# Patient Record
Sex: FEMALE | Race: WHITE
[De-identification: names, ages, dates, MRNs, and addresses within clinical notes are randomized per-mention and may not be internally consistent; named-entity substitution may affect disease eponyms.]

---

## 2017-02-11 ENCOUNTER — HOSPITAL ENCOUNTER (OUTPATIENT)
Dept: HOSPITAL 53 - M LAB REF | Age: 37
Discharge: HOME | End: 2017-02-11
Attending: PHYSICIAN ASSISTANT
Payer: COMMERCIAL

## 2017-02-11 DIAGNOSIS — J11.1: Primary | ICD-10-CM

## 2017-03-13 ENCOUNTER — HOSPITAL ENCOUNTER (OUTPATIENT)
Dept: HOSPITAL 53 - M LAB REF | Age: 37
End: 2017-03-13
Attending: ADVANCED PRACTICE MIDWIFE
Payer: COMMERCIAL

## 2017-03-13 DIAGNOSIS — Z12.4: Primary | ICD-10-CM

## 2017-03-13 DIAGNOSIS — R87.610: ICD-10-CM

## 2018-01-05 ENCOUNTER — HOSPITAL ENCOUNTER (EMERGENCY)
Dept: HOSPITAL 53 - M ED | Age: 38
LOS: 1 days | Discharge: HOME | End: 2018-01-06
Payer: COMMERCIAL

## 2018-01-05 DIAGNOSIS — R10.9: ICD-10-CM

## 2018-01-05 DIAGNOSIS — K58.9: ICD-10-CM

## 2018-01-05 DIAGNOSIS — Z88.8: ICD-10-CM

## 2018-01-05 DIAGNOSIS — Z97.5: ICD-10-CM

## 2018-01-05 DIAGNOSIS — N39.0: ICD-10-CM

## 2018-01-05 DIAGNOSIS — K21.9: ICD-10-CM

## 2018-01-05 DIAGNOSIS — Z79.899: ICD-10-CM

## 2018-01-05 DIAGNOSIS — K57.90: ICD-10-CM

## 2018-01-05 DIAGNOSIS — Z87.440: ICD-10-CM

## 2018-01-05 DIAGNOSIS — Z88.5: ICD-10-CM

## 2018-01-05 DIAGNOSIS — Z88.0: ICD-10-CM

## 2018-01-05 DIAGNOSIS — N83.299: Primary | ICD-10-CM

## 2018-01-05 LAB
ALBUMIN/GLOBULIN RATIO: 0.73 (ref 1–1.93)
ALBUMIN: 3.6 GM/DL (ref 3.2–5.2)
ALKALINE PHOSPHATASE: 146 U/L (ref 45–117)
ALT SERPL W P-5'-P-CCNC: 22 U/L (ref 12–78)
ANION GAP: 8 MEQ/L (ref 8–16)
AST SERPL-CCNC: 14 U/L (ref 7–37)
BASO #: 0.1 10^3/UL (ref 0–0.2)
BASO %: 0.3 % (ref 0–1)
BILIRUB CONJ SERPL-MCNC: < 0.1 MG/DL (ref 0–0.2)
BILIRUBIN,TOTAL: 0.3 MG/DL (ref 0.2–1)
BLOOD UREA NITROGEN: 9 MG/DL (ref 7–18)
CALCIUM LEVEL: 8.7 MG/DL (ref 8.5–10.1)
CARBON DIOXIDE LEVEL: 25 MEQ/L (ref 21–32)
CHLORIDE LEVEL: 107 MEQ/L (ref 98–107)
CONTROL LINE UCG: (no result)
CREATININE FOR GFR: 0.78 MG/DL (ref 0.55–1.02)
EOS #: 0 10^3/UL (ref 0–0.5)
EOSINOPHIL NFR BLD AUTO: 0.1 % (ref 0–3)
GFR SERPL CREATININE-BSD FRML MDRD: > 60 ML/MIN/{1.73_M2} (ref 60–?)
GLUCOSE, FASTING: 82 MG/DL (ref 70–105)
HEMATOCRIT: 44.7 % (ref 36–47)
HEMOGLOBIN: 14.6 G/DL (ref 12–16)
IMMATURE GRANULOCYTE #: 0.1 10^3/UL (ref 0–0)
IMMATURE GRANULOCYTE %: 0.4 % (ref 0–0)
INR: 1.16
LEUKOCYTE ESTERASE UR AUTO RFX: NEGATIVE
LIPASE: 108 U/L (ref 73–393)
LYMPH #: 1.6 10^3/UL (ref 1.5–4.5)
LYMPH %: 9.3 % (ref 24–44)
MEAN CORPUSCULAR HEMOGLOBIN: 29.9 PG (ref 27–33)
MEAN CORPUSCULAR HGB CONC: 32.7 G/DL (ref 32–36.5)
MEAN CORPUSCULAR VOLUME: 91.6 FL (ref 80–96)
MONO #: 0.6 10^3/UL (ref 0–0.8)
MONO %: 3.7 % (ref 0–5)
NEUTROPHILS #: 14.4 10^3/UL (ref 1.8–7.7)
NEUTROPHILS %: 86.2 % (ref 36–66)
NRBC BLD AUTO-RTO: 0 % (ref 0–0)
PLATELET COUNT, AUTOMATED: 353 10^3/UL (ref 150–450)
POTASSIUM SERUM: 3.6 MEQ/L (ref 3.5–5.1)
PROTHROMBIN TIME: 15 SECONDS (ref 12.4–14.5)
RED BLOOD COUNT: 4.88 10^6/UL (ref 4–5.4)
RED CELL DISTRIBUTION WIDTH: 12.4 % (ref 11.5–14.5)
SODIUM LEVEL: 140 MEQ/L (ref 136–145)
SPECIFIC GRAVITY UR AUTO RFX: 1.02 (ref 1–1.03)
SQUAM EPITHELIAL CELL UR AURFX: 1 /HPF (ref 0–6)
TOTAL PROTEIN: 8.5 GM/DL (ref 6.4–8.2)
URINE PREG TEST: NEGATIVE
WHITE BLOOD COUNT: 16.8 10^3/UL (ref 4–10)

## 2018-01-05 RX ADMIN — MORPHINE SULFATE 1 MG: 2 INJECTION, SOLUTION INTRAMUSCULAR; INTRAVENOUS at 21:55

## 2018-01-05 RX ADMIN — ONDANSETRON 1 MG: 2 INJECTION INTRAMUSCULAR; INTRAVENOUS at 21:15

## 2018-01-05 RX ADMIN — DIATRIZOATE MEGLUMINE AND DIATRIZOATE SODIUM 1 ML: 600; 100 SOLUTION ORAL; RECTAL at 22:35

## 2018-01-05 RX ADMIN — DIATRIZOATE MEGLUMINE AND DIATRIZOATE SODIUM 1 ML: 600; 100 SOLUTION ORAL; RECTAL at 21:30

## 2018-01-05 RX ADMIN — SODIUM CHLORIDE 1 MLS/HR: 9 INJECTION, SOLUTION INTRAVENOUS at 21:15

## 2018-01-06 RX ADMIN — HYDROCODONE BITARTRATE AND ACETAMINOPHEN 1 TAB: 5; 325 TABLET ORAL at 00:03

## 2018-01-06 RX ADMIN — CIPROFLOXACIN HYDROCHLORIDE 1 MG: 500 TABLET, FILM COATED ORAL at 00:02

## 2018-01-11 ENCOUNTER — HOSPITAL ENCOUNTER (OUTPATIENT)
Dept: HOSPITAL 53 - M LAB REF | Age: 38
End: 2018-01-11
Attending: NURSE PRACTITIONER
Payer: COMMERCIAL

## 2018-01-11 DIAGNOSIS — R31.9: Primary | ICD-10-CM

## 2018-01-11 PROCEDURE — 87086 URINE CULTURE/COLONY COUNT: CPT

## 2018-06-17 ENCOUNTER — HOSPITAL ENCOUNTER (OUTPATIENT)
Dept: HOSPITAL 53 - M WUC | Age: 38
End: 2018-06-17
Attending: PHYSICIAN ASSISTANT
Payer: SELF-PAY
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
## 2018-06-17 DIAGNOSIS — S60.211A: Primary | ICD-10-CM
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
## 2018-06-17 PROCEDURE — 73110 X-RAY EXAM OF WRIST: CPT

## 2018-11-29 ENCOUNTER — HOSPITAL ENCOUNTER (EMERGENCY)
Dept: HOSPITAL 53 - M ED | Age: 38
Discharge: HOME | End: 2018-11-29
Payer: COMMERCIAL

## 2018-11-29 DIAGNOSIS — Z87.891: ICD-10-CM

## 2018-11-29 DIAGNOSIS — K21.9: ICD-10-CM

## 2018-11-29 DIAGNOSIS — J69.0: Primary | ICD-10-CM

## 2018-11-29 DIAGNOSIS — M19.90: ICD-10-CM

## 2018-11-29 DIAGNOSIS — K58.9: ICD-10-CM

## 2018-11-29 DIAGNOSIS — Z86.19: ICD-10-CM

## 2018-11-29 DIAGNOSIS — G43.909: ICD-10-CM

## 2018-11-29 DIAGNOSIS — Z79.01: ICD-10-CM

## 2018-11-29 DIAGNOSIS — F90.9: ICD-10-CM

## 2018-11-29 DIAGNOSIS — Z86.711: ICD-10-CM

## 2018-11-29 LAB
ANION GAP: 8 MEQ/L (ref 8–16)
BASO #: 0 10^3/UL (ref 0–0.2)
BASO %: 0.2 % (ref 0–1)
BLOOD UREA NITROGEN: 9 MG/DL (ref 7–18)
CALCIUM LEVEL: 8.5 MG/DL (ref 8.5–10.1)
CARBON DIOXIDE LEVEL: 24 MEQ/L (ref 21–32)
CHLORIDE LEVEL: 105 MEQ/L (ref 98–107)
CK MB CFR.DF SERPL CALC: 1.28
CK SERPL-CCNC: 78 U/L (ref 26–192)
CK-MB VALUE MASS: < 1 NG/ML (ref ?–3.6)
CONTROL LINE HCG: (no result)
CREATININE FOR GFR: 0.96 MG/DL (ref 0.55–1.3)
CRP SERPL-MCNC: 2.37 MG/DL (ref 0–0.3)
EOS #: 0 10^3/UL (ref 0–0.5)
EOSINOPHIL NFR BLD AUTO: 0 % (ref 0–3)
ERYTHROCYTE SEDIMENTATION RATE: 28 MM/HR (ref 0–20)
GFR SERPL CREATININE-BSD FRML MDRD: > 60 ML/MIN/{1.73_M2} (ref 60–?)
GLUCOSE, FASTING: 100 MG/DL (ref 70–100)
HCG, SERUM QUALITATIVE: NEGATIVE
HEMATOCRIT: 40.3 % (ref 36–47)
HEMOGLOBIN: 13.3 G/DL (ref 12–15.5)
IMMATURE GRANULOCYTE %: 0.3 % (ref 0–3)
INR: 1.58
LYMPH #: 0.8 10^3/UL (ref 1.5–4.5)
LYMPH %: 4.7 % (ref 24–44)
MEAN CORPUSCULAR HEMOGLOBIN: 29.7 PG (ref 27–33)
MEAN CORPUSCULAR HGB CONC: 33 G/DL (ref 32–36.5)
MEAN CORPUSCULAR VOLUME: 90 FL (ref 80–96)
MONO #: 0.7 10^3/UL (ref 0–0.8)
MONO %: 4 % (ref 0–5)
NEUTROPHILS #: 16 10^3/UL (ref 1.8–7.7)
NEUTROPHILS %: 90.8 % (ref 36–66)
NRBC BLD AUTO-RTO: 0 % (ref 0–0)
NT-PRO BNP: 47 PG/ML (ref ?–125)
PARTIAL THROMBOPLASTIN TIME: 34.4 SECONDS (ref 25.4–37.6)
PLATELET COUNT, AUTOMATED: 368 10^3/UL (ref 150–450)
POTASSIUM SERUM: 3.8 MEQ/L (ref 3.5–5.1)
PROTHROMBIN TIME: 19.2 SECONDS (ref 12.1–14.4)
RED BLOOD COUNT: 4.48 10^6/UL (ref 4–5.4)
RED CELL DISTRIBUTION WIDTH: 12.7 % (ref 11.5–14.5)
SODIUM LEVEL: 137 MEQ/L (ref 136–145)
TROPONIN I: < 0.02 NG/ML (ref ?–0.1)
WHITE BLOOD COUNT: 17.6 10^3/UL (ref 4–10)

## 2018-11-29 RX ADMIN — HYDROCODONE BITARTRATE AND ACETAMINOPHEN 1 TAB: 5; 325 TABLET ORAL at 20:15

## 2018-11-29 RX ADMIN — SODIUM CHLORIDE 1 MLS/HR: 9 INJECTION, SOLUTION INTRAVENOUS at 16:04

## 2018-11-29 RX ADMIN — ACETAMINOPHEN 1 MG: 325 TABLET ORAL at 16:03

## 2018-11-29 RX ADMIN — CLINDAMYCIN HYDROCHLORIDE 1 MG: 150 CAPSULE ORAL at 20:00

## 2018-11-29 RX ADMIN — KETOROLAC TROMETHAMINE 1 MG: 30 INJECTION, SOLUTION INTRAMUSCULAR at 18:30

## 2018-12-05 ENCOUNTER — HOSPITAL ENCOUNTER (OUTPATIENT)
Dept: HOSPITAL 53 - M LAB REF | Age: 38
End: 2018-12-05
Attending: NURSE PRACTITIONER
Payer: COMMERCIAL

## 2018-12-05 DIAGNOSIS — R19.7: Primary | ICD-10-CM

## 2018-12-05 PROCEDURE — 87507 IADNA-DNA/RNA PROBE TQ 12-25: CPT

## 2019-04-12 ENCOUNTER — HOSPITAL ENCOUNTER (OUTPATIENT)
Dept: HOSPITAL 53 - M LAB REF | Age: 39
End: 2019-04-12
Attending: NURSE PRACTITIONER
Payer: COMMERCIAL

## 2019-04-12 DIAGNOSIS — N76.0: Primary | ICD-10-CM

## 2019-09-10 ENCOUNTER — HOSPITAL ENCOUNTER (OUTPATIENT)
Dept: HOSPITAL 53 - M LAB REF | Age: 39
End: 2019-09-10
Attending: ADVANCED PRACTICE MIDWIFE
Payer: COMMERCIAL

## 2019-09-10 DIAGNOSIS — R10.2: Primary | ICD-10-CM

## 2019-09-10 DIAGNOSIS — Z12.4: Primary | ICD-10-CM

## 2019-09-10 LAB
CHLAMYDIA DNA AMPLIFICATION: NEGATIVE
N GONORRHOEA RRNA SPEC QL NAA+PROBE: NEGATIVE

## 2019-09-10 PROCEDURE — 87624 HPV HI-RISK TYP POOLED RSLT: CPT

## 2019-09-11 LAB — HPV LOW VOL RFLX: (no result)

## 2019-09-17 ENCOUNTER — HOSPITAL ENCOUNTER (OUTPATIENT)
Dept: HOSPITAL 53 - M RAD | Age: 39
End: 2019-09-17
Attending: ADVANCED PRACTICE MIDWIFE
Payer: COMMERCIAL

## 2019-09-17 DIAGNOSIS — N63.20: Primary | ICD-10-CM

## 2019-09-17 DIAGNOSIS — N92.6: ICD-10-CM

## 2019-09-17 NOTE — REPMRS
Patient History

The patient states she had a clinical breast exam in Sept. 2019.

Family history of breast cancer at age 58 in mother, ovarian 

cancer at age 60 in maternal aunt, bladder cancer in father.

Taking hormonal contraceptives for 2 years 6 months.

 

3D TOMOSYNTHESIS WAS PERFORMED.

 

 

Digital Mammo Diagnostic Bilateral: September 17, 2019 - Exam #: 

[id number]

Bilateral CC and MLO view(s) were taken.

 

Technologist: Amanda Jeffery, Technologist

FINDINGS: The breast tissue is extremely dense which could 

obscure a lesion on mammography.  There is no evidence of cancer 

on this mammogram.

 

Assessment: BI-RADS/ACR category 2 mammogram. Benign Findings.

 

Recommendation

Routine screening mammogram of both breasts in 1 year (for women 

over age 40).

This mammogram was interpreted with the aid of an FDA-approved 

computer-aided dectection system.

 

THE LIFETIME RISK OF BREAST CANCER IS  28.6%, THEREFORE 

SUPPLEMENTAL SCREENING MRI OF THE BREASTS IS RECOMMENDED IN 6 

MONTHS.

 

Electronically Signed By: Marvin Fish MD 09/17/19 0917

## 2019-09-18 NOTE — REP
Clinical:  Irregular menstrual cycles and menorrhagia .

 

Technique:  Transabdominal pelvic ultrasound with color Doppler evaluation of the

ovaries.

 

Findings:

 

Bladder is unremarkable and measures 7.2 x 2.1 x 5.0 cm

 

Normal anteverted uterus measures 8.8 x 3.8 x 5.6 cm .  The endometrial complex

measures 2.5 mm thickness.  No discrete uterine or endometrial abnormalities are

appreciated.  IUD identified in satisfactory position.

 

Bilateral ovaries are normal in appearance and vascularity without evidence for

torsion.  Right ovary measures 3.2 x 2.0 x 2.4 cm ; R I = 0.58 .  Left ovary

measures 3.2 x 1.3 x 1.9 cm ; R I = 0.70.  Incidental 2.1 cm right ovarian cyst

likely physiologic.

 

No pelvic free fluid or adnexal mass lesion

 

Impression:

1.  Normal pelvic ultrasound.

 

 

Electronically Signed by

Balta Eldridge MD 09/18/2019 04:32 A

## 2019-11-25 ENCOUNTER — HOSPITAL ENCOUNTER (EMERGENCY)
Dept: HOSPITAL 53 - M ED | Age: 39
Discharge: HOME | End: 2019-11-25
Payer: COMMERCIAL

## 2019-11-25 VITALS — DIASTOLIC BLOOD PRESSURE: 72 MMHG | SYSTOLIC BLOOD PRESSURE: 125 MMHG

## 2019-11-25 VITALS — BODY MASS INDEX: 38.92 KG/M2 | HEIGHT: 64 IN | WEIGHT: 227.96 LBS

## 2019-11-25 DIAGNOSIS — Z88.8: ICD-10-CM
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
## 2019-11-25 DIAGNOSIS — S50.01XA: ICD-10-CM

## 2019-11-25 DIAGNOSIS — Z88.1: ICD-10-CM

## 2019-11-25 DIAGNOSIS — F43.10: ICD-10-CM

## 2019-11-25 DIAGNOSIS — Z88.0: ICD-10-CM

## 2019-11-25 DIAGNOSIS — Z79.899: ICD-10-CM
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
## 2019-11-25 DIAGNOSIS — Z88.5: ICD-10-CM

## 2019-11-25 DIAGNOSIS — S30.0XXA: ICD-10-CM

## 2019-11-25 DIAGNOSIS — S09.90XA: Primary | ICD-10-CM

## 2019-11-25 DIAGNOSIS — F41.9: ICD-10-CM

## 2019-11-25 DIAGNOSIS — Z79.01: ICD-10-CM

## 2019-11-25 DIAGNOSIS — F33.9: ICD-10-CM

## 2019-11-25 DIAGNOSIS — S63.501A: ICD-10-CM

## 2019-11-25 DIAGNOSIS — J45.909: ICD-10-CM

## 2019-11-25 DIAGNOSIS — S16.1XXA: ICD-10-CM

## 2019-11-25 LAB
ALBUMIN SERPL BCG-MCNC: 3.3 GM/DL (ref 3.2–5.2)
ALT SERPL W P-5'-P-CCNC: 14 U/L (ref 12–78)
BASOPHILS # BLD AUTO: 0.1 10^3/UL (ref 0–0.2)
BASOPHILS NFR BLD AUTO: 0.6 % (ref 0–1)
BILIRUB CONJ SERPL-MCNC: < 0.1 MG/DL (ref 0–0.2)
BILIRUB SERPL-MCNC: 0.2 MG/DL (ref 0.2–1)
BUN SERPL-MCNC: 5 MG/DL (ref 7–18)
CALCIUM SERPL-MCNC: 8.5 MG/DL (ref 8.5–10.1)
CHLORIDE SERPL-SCNC: 106 MEQ/L (ref 98–107)
CO2 SERPL-SCNC: 26 MEQ/L (ref 21–32)
CREAT SERPL-MCNC: 0.94 MG/DL (ref 0.55–1.3)
EOSINOPHIL # BLD AUTO: 0.1 10^3/UL (ref 0–0.5)
EOSINOPHIL NFR BLD AUTO: 0.8 % (ref 0–3)
GFR SERPL CREATININE-BSD FRML MDRD: > 60 ML/MIN/{1.73_M2} (ref 60–?)
GLUCOSE SERPL-MCNC: 85 MG/DL (ref 70–100)
HCT VFR BLD AUTO: 42.2 % (ref 36–47)
HGB BLD-MCNC: 13.5 G/DL (ref 12–15.5)
LIPASE SERPL-CCNC: 50 U/L (ref 73–393)
LYMPHOCYTES # BLD AUTO: 1.8 10^3/UL (ref 1.5–5)
LYMPHOCYTES NFR BLD AUTO: 22.9 % (ref 24–44)
MCH RBC QN AUTO: 29.3 PG (ref 27–33)
MCHC RBC AUTO-ENTMCNC: 32 G/DL (ref 32–36.5)
MCV RBC AUTO: 91.5 FL (ref 80–96)
MONOCYTES # BLD AUTO: 0.5 10^3/UL (ref 0–0.8)
MONOCYTES NFR BLD AUTO: 6.7 % (ref 0–5)
NEUTROPHILS # BLD AUTO: 5.4 10^3/UL (ref 1.5–8.5)
NEUTROPHILS NFR BLD AUTO: 68.6 % (ref 36–66)
PLATELET # BLD AUTO: 346 10^3/UL (ref 150–450)
POTASSIUM SERPL-SCNC: 4 MEQ/L (ref 3.5–5.1)
PROT SERPL-MCNC: 7.3 GM/DL (ref 6.4–8.2)
RBC # BLD AUTO: 4.61 10^6/UL (ref 4–5.4)
SODIUM SERPL-SCNC: 139 MEQ/L (ref 136–145)
WBC # BLD AUTO: 7.9 10^3/UL (ref 4–10)

## 2019-11-25 PROCEDURE — 80076 HEPATIC FUNCTION PANEL: CPT

## 2019-11-25 PROCEDURE — 74177 CT ABD & PELVIS W/CONTRAST: CPT

## 2019-11-25 PROCEDURE — 99283 EMERGENCY DEPT VISIT LOW MDM: CPT

## 2019-11-25 PROCEDURE — 72125 CT NECK SPINE W/O DYE: CPT

## 2019-11-25 PROCEDURE — 81001 URINALYSIS AUTO W/SCOPE: CPT

## 2019-11-25 PROCEDURE — 73110 X-RAY EXAM OF WRIST: CPT

## 2019-11-25 PROCEDURE — 85025 COMPLETE CBC W/AUTO DIFF WBC: CPT

## 2019-11-25 PROCEDURE — 73080 X-RAY EXAM OF ELBOW: CPT

## 2019-11-25 PROCEDURE — 72110 X-RAY EXAM L-2 SPINE 4/>VWS: CPT

## 2019-11-25 PROCEDURE — 80048 BASIC METABOLIC PNL TOTAL CA: CPT

## 2019-11-25 PROCEDURE — 70450 CT HEAD/BRAIN W/O DYE: CPT

## 2019-11-25 PROCEDURE — 83690 ASSAY OF LIPASE: CPT

## 2019-11-25 PROCEDURE — 36415 COLL VENOUS BLD VENIPUNCTURE: CPT

## 2019-11-25 NOTE — REP
RIGHT WRIST, FOUR VIEWS:

 

There is no evidence of an acute fracture, dislocation or intrinsic bone

disease.

 

IMPRESSION:

 

No fracture or dislocation.

 

 

 

 

Electronically Signed by

Marvin Fish MD 11/26/2019 09:52 A
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
## 2019-11-25 NOTE — REP
RIGHT ELBOW, FOUR VIEWS:

 

There is no evidence of an acute fracture, dislocation or intrinsic bone

disease.

 

IMPRESSION:

 

No fracture or dislocation.

 

 

Electronically Signed by

Marvin Fish MD 11/26/2019 09:52 A

## 2020-08-28 ENCOUNTER — HOSPITAL ENCOUNTER (OUTPATIENT)
Dept: HOSPITAL 53 - M WUC | Age: 40
End: 2020-08-28
Attending: NURSE PRACTITIONER
Payer: COMMERCIAL

## 2020-08-28 DIAGNOSIS — M94.0: Primary | ICD-10-CM

## 2020-09-15 NOTE — REP
CHEST X-RAY:  



HISTORY:  Costochondral junction syndrome.



TECHNIQUE: PA and lateral 



COMPARISON: 4/25/16



FINDINGS: 

Mediastinum and cardiac silhouette are normal. Lung fields are clear. No focal 
consolidation, effusion or pneumothorax. Surrounding musculoskeletal structure 
are intact and normal in appearance.



IMPRESSION: 

Normal chest x-ray.

MTDD

## 2020-09-28 ENCOUNTER — HOSPITAL ENCOUNTER (EMERGENCY)
Dept: HOSPITAL 53 - M ED | Age: 40
Discharge: HOME | End: 2020-09-28
Payer: COMMERCIAL

## 2020-09-28 VITALS — SYSTOLIC BLOOD PRESSURE: 120 MMHG | DIASTOLIC BLOOD PRESSURE: 83 MMHG

## 2020-09-28 VITALS — BODY MASS INDEX: 42.12 KG/M2 | HEIGHT: 64 IN | WEIGHT: 246.7 LBS

## 2020-09-28 DIAGNOSIS — K21.9: ICD-10-CM

## 2020-09-28 DIAGNOSIS — Z88.1: ICD-10-CM

## 2020-09-28 DIAGNOSIS — Z88.8: ICD-10-CM

## 2020-09-28 DIAGNOSIS — J30.2: ICD-10-CM

## 2020-09-28 DIAGNOSIS — J45.901: Primary | ICD-10-CM

## 2020-09-28 DIAGNOSIS — Z79.899: ICD-10-CM

## 2020-09-28 DIAGNOSIS — Z79.3: ICD-10-CM

## 2020-09-28 DIAGNOSIS — Z88.5: ICD-10-CM

## 2020-09-28 LAB
ALBUMIN SERPL BCG-MCNC: 3.2 GM/DL (ref 3.2–5.2)
ALT SERPL W P-5'-P-CCNC: 23 U/L (ref 12–78)
B-HCG SERPL QL: NEGATIVE
BASOPHILS # BLD AUTO: 0 10^3/UL (ref 0–0.2)
BASOPHILS NFR BLD AUTO: 0.4 % (ref 0–1)
BILIRUB CONJ SERPL-MCNC: < 0.1 MG/DL (ref 0–0.2)
BILIRUB SERPL-MCNC: 0.2 MG/DL (ref 0.2–1)
BUN SERPL-MCNC: 9 MG/DL (ref 7–18)
CALCIUM SERPL-MCNC: 9.2 MG/DL (ref 8.5–10.1)
CHLORIDE SERPL-SCNC: 107 MEQ/L (ref 98–107)
CK MB CFR.DF SERPL CALC: 1.59
CK MB SERPL-MCNC: < 1 NG/ML (ref ?–3.6)
CK SERPL-CCNC: 63 U/L (ref 26–192)
CO2 SERPL-SCNC: 24 MEQ/L (ref 21–32)
CREAT SERPL-MCNC: 0.9 MG/DL (ref 0.55–1.3)
EOSINOPHIL # BLD AUTO: 0.1 10^3/UL (ref 0–0.5)
EOSINOPHIL NFR BLD AUTO: 0.9 % (ref 0–3)
GFR SERPL CREATININE-BSD FRML MDRD: > 60 ML/MIN/{1.73_M2} (ref 58–?)
GLUCOSE SERPL-MCNC: 88 MG/DL (ref 70–100)
HCT VFR BLD AUTO: 39.6 % (ref 36–47)
HGB BLD-MCNC: 13.1 G/DL (ref 12–15.5)
INR PPP: 1.13
LYMPHOCYTES # BLD AUTO: 2.3 10^3/UL (ref 1.5–5)
LYMPHOCYTES NFR BLD AUTO: 26.2 % (ref 24–44)
MCH RBC QN AUTO: 28.9 PG (ref 27–33)
MCHC RBC AUTO-ENTMCNC: 33.1 G/DL (ref 32–36.5)
MCV RBC AUTO: 87.2 FL (ref 80–96)
MONOCYTES # BLD AUTO: 0.5 10^3/UL (ref 0–0.8)
MONOCYTES NFR BLD AUTO: 5.8 % (ref 0–5)
NEUTROPHILS # BLD AUTO: 5.9 10^3/UL (ref 1.5–8.5)
NEUTROPHILS NFR BLD AUTO: 66.4 % (ref 36–66)
NT-PRO BNP: 10 PG/ML (ref ?–125)
PLATELET # BLD AUTO: 411 10^3/UL (ref 150–450)
POTASSIUM SERPL-SCNC: 4 MEQ/L (ref 3.5–5.1)
PROT SERPL-MCNC: 7.5 GM/DL (ref 6.4–8.2)
PROTHROMBIN TIME: 14.7 SECONDS (ref 12.5–14.3)
RBC # BLD AUTO: 4.54 10^6/UL (ref 4–5.4)
SODIUM SERPL-SCNC: 137 MEQ/L (ref 136–145)
T4 SERPL-MCNC: 8.9 UG/DL (ref 4.5–12)
TROPONIN I SERPL-MCNC: < 0.02 NG/ML (ref ?–0.1)
TSH SERPL DL<=0.005 MIU/L-ACNC: 2.67 UIU/ML (ref 0.36–3.74)
WBC # BLD AUTO: 8.9 10^3/UL (ref 4–10)

## 2020-09-28 PROCEDURE — 82553 CREATINE MB FRACTION: CPT

## 2020-09-28 PROCEDURE — 80048 BASIC METABOLIC PNL TOTAL CA: CPT

## 2020-09-28 PROCEDURE — 99285 EMERGENCY DEPT VISIT HI MDM: CPT

## 2020-09-28 PROCEDURE — 93041 RHYTHM ECG TRACING: CPT

## 2020-09-28 PROCEDURE — 71045 X-RAY EXAM CHEST 1 VIEW: CPT

## 2020-09-28 PROCEDURE — 85610 PROTHROMBIN TIME: CPT

## 2020-09-28 PROCEDURE — 93005 ELECTROCARDIOGRAM TRACING: CPT

## 2020-09-28 PROCEDURE — 87040 BLOOD CULTURE FOR BACTERIA: CPT

## 2020-09-28 PROCEDURE — 96374 THER/PROPH/DIAG INJ IV PUSH: CPT

## 2020-09-28 PROCEDURE — 84703 CHORIONIC GONADOTROPIN ASSAY: CPT

## 2020-09-28 PROCEDURE — 84436 ASSAY OF TOTAL THYROXINE: CPT

## 2020-09-28 PROCEDURE — 85025 COMPLETE CBC W/AUTO DIFF WBC: CPT

## 2020-09-28 PROCEDURE — 84484 ASSAY OF TROPONIN QUANT: CPT

## 2020-09-28 PROCEDURE — 94760 N-INVAS EAR/PLS OXIMETRY 1: CPT

## 2020-09-28 PROCEDURE — 83880 ASSAY OF NATRIURETIC PEPTIDE: CPT

## 2020-09-28 PROCEDURE — 80076 HEPATIC FUNCTION PANEL: CPT

## 2020-09-28 PROCEDURE — 84443 ASSAY THYROID STIM HORMONE: CPT

## 2020-09-28 PROCEDURE — 94640 AIRWAY INHALATION TREATMENT: CPT

## 2020-09-28 PROCEDURE — 82550 ASSAY OF CK (CPK): CPT

## 2020-09-28 NOTE — REPVR
PROCEDURE INFORMATION: 

Exam: XR Chest, 1 View 

Exam date and time: 9/28/2020 8:47 PM 

Age: 40 years old 

Clinical indication: Chest pain; Type not specified; Additional info: 

Dyspnea/cough 



TECHNIQUE: 

Imaging protocol: XR of the chest 

Views: 1 view. 



COMPARISON: 

CR CHEST 2 VIEW 8/28/2020 11:57 AM 



FINDINGS: 

Lungs: Unremarkable. No consolidation. 

Pleural space: Unremarkable. No pleural effusion. No pneumothorax. 

Heart/Mediastinum: Unremarkable. No cardiomegaly. 

Bones/joints: Unremarkable. 



IMPRESSION: 

No acute findings. 



Electronically signed by: Gunnar Franklin On 09/28/2020  22:24:34 PM

## 2020-09-29 NOTE — ECGEPIP
[facility] - ED

                                       

                                       Test Date:    2020

Pat Name:     FREDY SAMANO          Department:   

Patient ID:   [id number]                 Room:         -

Gender:       Female                   Technician:   tonya

:          1980               Requested By: ZACK LOPEZ

Order Number: [id number]     Reading MD:   Narendra Higgins

                                 Measurements

Intervals                              Axis          

Rate:         84                       P:            15

ID:           [id number]                      QRS:          -15

QRSD:         86                       T:            31

QT:           341                                    

QTc:          403                                    

                           Interpretive Statements

SINUS RHYTHM

POSSIBLE PRIOR INFERIOR INFARCT

SIMILAR TO 18

Electronically Signed on 2020 9:39:09 EDT by Narendra Higgins

## 2021-01-06 ENCOUNTER — HOSPITAL ENCOUNTER (OUTPATIENT)
Dept: HOSPITAL 53 - M LAB | Age: 41
End: 2021-01-06
Attending: INTERNAL MEDICINE
Payer: COMMERCIAL

## 2021-01-06 DIAGNOSIS — M79.10: Primary | ICD-10-CM

## 2021-01-06 LAB
25(OH)D3 SERPL-MCNC: 47 NG/ML (ref 30–100)
CK SERPL-CCNC: 63 U/L (ref 26–192)
MAGNESIUM SERPL-MCNC: 2.2 MG/DL (ref 1.8–2.4)
PHOSPHATE SERPL-MCNC: 2.2 MG/DL (ref 2.5–4.9)
VIT B12 SERPL-MCNC: 410 PG/ML (ref 247–911)

## 2021-01-21 ENCOUNTER — HOSPITAL ENCOUNTER (OUTPATIENT)
Dept: HOSPITAL 53 - M WHC | Age: 41
End: 2021-01-21
Attending: ADVANCED PRACTICE MIDWIFE
Payer: COMMERCIAL

## 2021-01-21 DIAGNOSIS — Z12.31: Primary | ICD-10-CM

## 2021-01-21 NOTE — REPMRS
Patient History

The patient states she had a clinical breast exam in January 2021.

Family history of breast cancer at age 58 in mother, ovarian 

cancer at age 60 in maternal aunt, unknown cancer in father.

Took hormonal contraceptives for 4 years 10 months.

 

3D TOMOSYNTHESIS WAS PERFORMED.

 

Volpara breast density a.

 

Digital Woman Screen Mammo: January 21, 2021 - Exam #: 

[id number]

Bilateral CC and MLO view(s) were taken.

 

Technologist: Conchis López, Technologist

Prior study comparison: September 17, 2019, digital mammo 

diagnostic bilateral, performed at [facility].

 

FINDINGS: There are scattered fibroglandular densities.  There 

has been no change in the appearance of the mammogram from the 

prior studies.  There is a mild amount of residual fibroglandular

tissue which is fairly symmetric. There is no interval 

development of dominant mass, architectural distortion, or 

clustered microcalcification suggestive of malignancy.

 

Assessment: BI-RADS/ACR category 1 mammogram. Negative Mammogram.

 

Recommendation

Routine screening mammogram in 1 year (for women over age 40).

This mammogram was interpreted with the aid of an FDA-approved 

computer-aided dectection system.

 

THE LIFETIME RISK OF BREAST CANCER IS  28.4%, THEREFORE 

SUPPLEMENTAL SCREENING MRI OF THE BREASTS IS RECOMMENDED IN 6 

MONTHS.

 

Electronically Signed By: Marvin Fish MD 01/21/21 0109

## 2021-02-18 ENCOUNTER — HOSPITAL ENCOUNTER (OUTPATIENT)
Dept: HOSPITAL 53 - M PLALAB | Age: 41
End: 2021-02-18
Attending: NURSE PRACTITIONER
Payer: COMMERCIAL

## 2021-02-18 DIAGNOSIS — E83.39: Primary | ICD-10-CM

## 2021-04-23 ENCOUNTER — HOSPITAL ENCOUNTER (OUTPATIENT)
Dept: HOSPITAL 53 - M SFHCRHEU | Age: 41
End: 2021-04-23
Attending: INTERNAL MEDICINE
Payer: COMMERCIAL

## 2021-04-23 DIAGNOSIS — E83.39: Primary | ICD-10-CM

## 2021-09-13 ENCOUNTER — HOSPITAL ENCOUNTER (OUTPATIENT)
Dept: HOSPITAL 53 - M PLALAB | Age: 41
End: 2021-09-13
Attending: INTERNAL MEDICINE
Payer: COMMERCIAL

## 2021-09-13 DIAGNOSIS — M25.50: ICD-10-CM

## 2021-09-13 DIAGNOSIS — M79.10: Primary | ICD-10-CM

## 2021-09-13 LAB
25(OH)D3 SERPL-MCNC: 30.4 NG/ML (ref 30–100)
CK SERPL-CCNC: 43 U/L (ref 26–192)
CRP SERPL-MCNC: 1.29 MG/DL (ref 0–0.3)
IRON SERPL-MCNC: 64 UG/DL (ref 50–170)
MAGNESIUM SERPL-MCNC: 2.3 MG/DL (ref 1.8–2.4)
PHOSPHATE SERPL-MCNC: 2.1 MG/DL (ref 2.5–4.9)
RHEUMATOID FACT SERPL-ACNC: < 10 IU/ML (ref ?–15)
TSH SERPL DL<=0.005 MIU/L-ACNC: 3.37 UIU/ML (ref 0.36–3.74)
VIT B12 SERPL-MCNC: 432 PG/ML (ref 247–911)

## 2021-09-14 LAB — CCP IGG SERPL-ACNC: 7 UNITS (ref 0–19)

## 2021-10-07 ENCOUNTER — HOSPITAL ENCOUNTER (OUTPATIENT)
Dept: HOSPITAL 53 - M SLEEP HO | Age: 41
End: 2021-10-07
Attending: INTERNAL MEDICINE
Payer: COMMERCIAL

## 2021-10-07 DIAGNOSIS — R53.83: Primary | ICD-10-CM

## 2021-10-12 NOTE — SLEEPHOME
DATE:  10/07/2021



ORDERED BY: Dr. Coby Rush



Diagnostic home sleep testing was performed due to concern for the obstructive

sleep apnea syndrome in this patient with a history of fatigue.



For testing, a NOX T3 respiratory monitoring device was used. Continuous record

was made of pulse, oxygen saturation, air flow, chest and abdominal strain, and

body position.



There was 9 hours and 59 minutes of data reviewed. There was 7 hours and 32

minutes marked as time in bed. During the interval marked time in bed, there

were 41 respiratory events identified of 10 seconds in duration or greater for

a respiratory event index of 5.4. The events were obstructive. Baseline pulse

rate 85. Pulse rate ranged . Baseline saturation 95%. Saturations fell to

86%, and testing was performed in both the supine and nonsupine positions. 



IMPRESSION: 

Abnormal home sleep testing with repetitive respiratory events and oxygen

desaturations to 86% with a respiratory event index of 5.4 is consistent with

the obstructive sleep apnea syndrome. 



RECOMMENDATION: The patient should be encouraged to undergo formal sleep

evaluation.

## 2021-11-10 ENCOUNTER — HOSPITAL ENCOUNTER (EMERGENCY)
Dept: HOSPITAL 53 - M ED | Age: 41
LOS: 1 days | Discharge: HOME | End: 2021-11-11
Payer: COMMERCIAL

## 2021-11-10 VITALS — HEIGHT: 64 IN | BODY MASS INDEX: 43.45 KG/M2 | WEIGHT: 254.52 LBS

## 2021-11-10 DIAGNOSIS — J02.9: ICD-10-CM

## 2021-11-10 DIAGNOSIS — Z79.899: ICD-10-CM

## 2021-11-10 DIAGNOSIS — Z88.1: ICD-10-CM

## 2021-11-10 DIAGNOSIS — J18.9: Primary | ICD-10-CM

## 2021-11-10 DIAGNOSIS — J98.11: ICD-10-CM

## 2021-11-10 DIAGNOSIS — R53.83: ICD-10-CM

## 2021-11-10 DIAGNOSIS — Z88.5: ICD-10-CM

## 2021-11-10 DIAGNOSIS — Z88.8: ICD-10-CM

## 2021-11-10 DIAGNOSIS — R00.0: ICD-10-CM

## 2021-11-10 DIAGNOSIS — Z86.711: ICD-10-CM

## 2021-11-10 DIAGNOSIS — E03.9: ICD-10-CM

## 2021-11-10 DIAGNOSIS — K21.9: ICD-10-CM

## 2021-11-10 LAB — RSV RNA NPH QL NAA+PROBE: NEGATIVE

## 2021-11-10 NOTE — CCD
Summarization Of Episode

                             Created on: 11/10/2021



FREDY CAPUTO

External Reference #: [id number]

: 1980

Sex: Undifferentiated



Demographics





                          Address                   [address] [place] [address] 

[place]  [address]

 

                          Home Phone                [phone]

 

                          Preferred Language        English

 

                          Marital Status            Unknown

 

                          Jehovah's witness Affiliation     Unknown

 

                          Race                      Unknown

 

                          Ethnic Group              Not  or 





Author





                          Author                    [facility]

 

                          Organization              [facility]

 

                          Address                   Unknown

 

                          Phone                     Unavailable







Support





                Name            Relationship    Address         Phone

 

                    INSTACART           Next Of Kin         UNKNOWN

[place]  [address]                    [phone]

 

                ST              Next Of Kin     Unknown         Unavailable

 

                    ADRY CAPUTO       Next Of Kin         [address] [place] [address] 

[facility] [place]  [address]                    [phone]

 

                    SELF EMPLOYED       Next Of Kin         [address] [place] [address] 

[place]  [address]                    [phone]

 

                UN              Next Of Kin     Unknown         Unavailable

 

                    ATUL  TED     Next Of Kin         [address] [place]  [address]                        [phone]

 

                UE              Next Of Kin     Unknown         Unavailable

 

                    JONATAN POLLARD  Next Of Kin         [address] [place]  [address]                        [phone]

 

                    KITTY CAPUTO     Next Of Kin         [address] [place] [address] 

[place]  [address]                    [phone]

 

                    KITTY CAPUTO      Next Of Kin         [address] [place] [address] 

[place]  [address]                    [phone]

 

                    Kitty Caputo      ECON                [address] [place]  [address]                    [phone]

 

                    ESETLA MCDANIEL       ECON                [address] [place]  [address]                       [phone]







Care Team Providers





                    Care Team Member Name Role                Phone

 

                    Maring,  Edgardo PA     Unavailable         Unavailable

 

                    Maring,  Edgardo PA     Unavailable         Unavailable

 

                    Maring,  Edgardo PA     Unavailable         Unavailable

 

                    Maring,  Edgardo PA     Unavailable         Unavailable

 

                    Maring,  Edgardo PA     Unavailable         Unavailable

 

                    Maring,  Edgardo PA     Unavailable         Unavailable

 

                    Maring,  Edgardo PA     Unavailable         Unavailable

 

                    Maring,  Edgardo PA     Unavailable         Unavailable

 

                    Maring,  Edgardo PA     Unavailable         Unavailable

 

                    Maring,  Edgardo PA     Unavailable         Unavailable

 

                    Maring,  Edgardo PA     Unavailable         Unavailable

 

                    Maring,  Edgardo PA     Unavailable         Unavailable

 

                    Maring,  Edgardo PA     Unavailable         Unavailable

 

                    Maring,  Edgardo PA     Unavailable         Unavailable

 

                    Maring,  Edgardo PA     Unavailable         Unavailable

 

                    Maring,  Edgardo PA     Unavailable         Unavailable

 

                    Abdullahi,  Izzy FNP Unavailable         Unavailable

 

                    Abdullahi,  Izzy FNP Unavailable         Unavailable

 

                    Abdullahi,  Izzy FNP Unavailable         Unavailable

 

                    Abdullahi,  Izzy FNP Unavailable         Unavailable

 

                    Abdullahi,  Izzy FNP Unavailable         Unavailable

 

                    Abdullahi,  Izzy FNP Unavailable         Unavailable

 

                    Abdullahi,  Izzy FNP Unavailable         Unavailable

 

                    Abdullahi,  Izzy FNP Unavailable         Unavailable

 

                    Abdullahi,  Izzy FNP Unavailable         Unavailable

 

                    Abdullahi,  Izzy FNP Unavailable         Unavailable

 

                    Abdullahi,  Izzy FNP Unavailable         Unavailable

 

                    Abdullahi,  Izzy FNP Unavailable         Unavailable

 

                    Abdullahi,  Izzy FNP Unavailable         Unavailable

 

                    Abdullahi,  Izzy FNP Unavailable         Unavailable

 

                    Abdullahi,  Izzy FNP Unavailable         Unavailable

 

                    Abdullhai,  Izzy FNP Unavailable         Unavailable

 

                    Abdullahi,  Izzy FNP Unavailable         Unavailable

 

                    Abdullahi,  Izzy FNP Unavailable         Unavailable

 

                    Abdullahi,  Izzy FNP Unavailable         Unavailable

 

                    Abdullahi,  Izzy FNP Unavailable         Unavailable

 

                    Abdullahi,  Izzy FNP Unavailable         Unavailable

 

                    Abdullahi,  Izzy FNP Unavailable         Unavailable

 

                    Abdullahi,  Izzy FNP Unavailable         Unavailable

 

                    Abdullahi,  Izzy FNP Unavailable         Unavailable

 

                    Abdullahi,  Izzy FNP Unavailable         Unavailable

 

                    Abdullahi,  Izzy FNP Unavailable         Unavailable

 

                    Abdullahi,  Izzy FNP Unavailable         Unavailable

 

                    Abdullahi,  Izzy FNP Unavailable         Unavailable

 

                    Abdullahi,  Izzy FNP Unavailable         Unavailable

 

                    Abdullahi,  Izzy FNP Unavailable         Unavailable

 

                    Abdullahi,  Izzy FNP Unavailable         Unavailable

 

                    Abdullahi,  Izzy FNP Unavailable         Unavailable

 

                    Abdullahi,  Izzy FNP Unavailable         Unavailable

 

                    Abdullahi,  Izzy FNP Unavailable         Unavailable

 

                    Abdullahi,  Izzy FNP Unavailable         Unavailable

 

                    MECHE LIMA MD Unavailable         Unavailable

 

                    MECHE LIMA MD Unavailable         Unavailable

 

                    MECHE LIMA MD Unavailable         Unavailable

 

                    CHROSTMECHE CLARK MD Unavailable         Unavailable

 

                    CHROSTMECHE CLARK MD Unavailable         Unavailable

 

                    CHROSTMECHE CLARK MD Unavailable         Unavailable

 

                    CHROSTMECHE CLARK MD Unavailable         Unavailable

 

                    CHRMECHE STUBBS MD Unavailable         Unavailable

 

                    REJIOSTMECHE CLARK MD Unavailable         Unavailable

 

                    CHROSTMECHE CLARK MD Unavailable         Unavailable

 

                    CHROSTMECHE CLARK MD Unavailable         Unavailable

 

                    CHROSTMECHE CLARK MD Unavailable         Unavailable

 

                    MECHE LIMA MD Unavailable         Unavailable

 

                    CHRMECHE STUBBS MD Unavailable         Unavailable

 

                    MECHE LIMA MD Unavailable         Unavailable

 

                    MECHE LIMA MD Unavailable         Unavailable

 

                    CHRMECHE STUBBS MD Unavailable         Unavailable

 

                    CHRMECHE STUBBS MD Unavailable         Unavailable

 

                    CHRMECHE STUBBS MD Unavailable         Unavailable

 

                    CHROSTMECHE CLARK MD Unavailable         Unavailable

 

                    CHROSTMECHE CLARK MD Unavailable         Unavailable

 

                    CHROSTMECHE CLARK MD Unavailable         Unavailable

 

                    CHROSTMECHE CLARK MD Unavailable         Unavailable

 

                    CHROSTMECHE CLARK MD Unavailable         Unavailable

 

                    CHROSTMECHE CLARK MD Unavailable         Unavailable

 

                    CHROSTMECHE CLARK MD Unavailable         Unavailable

 

                    CHROSTMECHE CLARK MD Unavailable         Unavailable

 

                    CHROSTMECHE CLARK MD Unavailable         Unavailable

 

                    CHROSTMECHE CLARK MD Unavailable         Unavailable

 

                    CHROSTMECHE CLARK MD Unavailable         Unavailable

 

                    CHROSTMECHE CLARK MD Unavailable         Unavailable

 

                    CHROSTMECHE CLARK MD Unavailable         Unavailable

 

                    CHROSTMECHE CLARK MD Unavailable         Unavailable

 

                    CHROSTMECHE CLARK MD Unavailable         Unavailable

 

                    CHROSTMECHE CLARK MD Unavailable         Unavailable

 

                    CHROSTMECHE CLARK MD Unavailable         Unavailable

 

                    CHRMECHE STUBBS MD Unavailable         Unavailable

 

                    CHRMECHE STUBBS MD Unavailable         Unavailable

 

                    CHRMECHE STUBBS MD Unavailable         Unavailable

 

                    LAROCK, LOKI MATHEWS NP Unavailable         Unavailable

 

                    LAROCK, LOKI MATHEWS NP Unavailable         Unavailable

 

                    LAROCK, LOKI MATHEWS NP Unavailable         Unavailable

 

                    LAROCK, LOKI MATHEWS NP Unavailable         Unavailable

 

                    LAROCK, LOKI MATHEWS NP Unavailable         Unavailable

 

                    LAROCK, LOKI MATHEWS NP Unavailable         Unavailable

 

                    LAROCK, LOKI MATHEWS NP Unavailable         Unavailable

 

                    LAROCK, LOKI MATHEWS NP Unavailable         Unavailable

 

                    LAROCK, LOKI MATHEWS NP Unavailable         Unavailable

 

                    LAROCK, LOKI MATHEWS NP Unavailable         Unavailable

 

                    LAROCK, LOKI MATHEWS NP Unavailable         Unavailable

 

                    LAROCK, LOKI MATHEWS NP Unavailable         Unavailable

 

                    LAROCK, LOKI MATHEWS NP Unavailable         Unavailable

 

                    LAROCK, LOKI MATHEWS NP Unavailable         Unavailable

 

                    LAROCK, LOKI MATHEWS NP Unavailable         Unavailable

 

                    LAROCK, LOKI MATHEWS NP Unavailable         Unavailable

 

                    LAROCK, LOKI MATHEWS NP Unavailable         Unavailable

 

                    LAROCK, LOKI MATHEWS NP Unavailable         Unavailable

 

                    LAROCK, LOKI MATHEWS NP Unavailable         Unavailable

 

                    LAROCK, LOKI MATHEWS NP Unavailable         Unavailable

 

                    LAROCK, LOKI MATHEWS NP Unavailable         Unavailable

 

                    LAROCK, LOKI MATHEWS NP Unavailable         Unavailable

 

                    Feola, T Zaynab PA   Unavailable         Unavailable

 

                    Feola, T Zaynab PA   Unavailable         Unavailable

 

                    Feola, T Zaynab PA   Unavailable         Unavailable

 

                    Feola, T Zaynab PA   Unavailable         Unavailable

 

                    Feola, T Zaynab PA   Unavailable         Unavailable

 

                    Feola, T Zaynab PA   Unavailable         Unavailable

 

                    Feola, T Zaynab PA   Unavailable         Unavailable

 

                    Feola, T Zaynab PA   Unavailable         Unavailable

 

                    Feola, T Zaynab PA   Unavailable         Unavailable

 

                    Feola, T Zaynab PA   Unavailable         Unavailable

 

                    Feola, T Zaynab PA   Unavailable         Unavailable

 

                    Feola, T Zaynab PA   Unavailable         Unavailable

 

                    Feola, T Zaynab PA   Unavailable         Unavailable

 

                    Feola, T Zaynab PA   Unavailable         Unavailable

 

                    Feola, T Zaynab PA   Unavailable         Unavailable

 

                    Feola, T Zaynab PA   Unavailable         Unavailable

 

                    Feola, T Zaynab PA   Unavailable         Unavailable

 

                    Feola, T Zaynab PA   Unavailable         Unavailable

 

                    Feola, T Zaynab PA   Unavailable         Unavailable

 

                    Feola, T Zaynab PA   Unavailable         Unavailable

 

                    Feola, T Zaynab PA   Unavailable         Unavailable

 

                    Feola, T Zaynab PA   Unavailable         Unavailable

 

                    Feola, T Zaynab PA   Unavailable         Unavailable

 

                    Feola, T Zaynab PA   Unavailable         Unavailable

 

                    Feola, T Zaynab PA   Unavailable         Unavailable

 

                    Feola, T Zaynab PA   Unavailable         Unavailable

 

                    Feola, T Zaynab PA   Unavailable         Unavailable

 

                    Feola, T Zaynab PA   Unavailable         Unavailable

 

                    Feola, T Zaynab PA   Unavailable         Unavailable

 

                    Feola, T Zaynab PA   Unavailable         Unavailable

 

                    Feola, T Zaynab PA   Unavailable         Unavailable

 

                    Feola, T Zaynab PA   Unavailable         Unavailable

 

                    Feola, T Zaynab PA   Unavailable         Unavailable

 

                    Feola, T Zaynab PA   Unavailable         Unavailable

 

                    Feola, T Zaynab PA   Unavailable         Unavailable

 

                    Feola, T Zaynab PA   Unavailable         Unavailable

 

                    Feola, T Zaynab PA   Unavailable         Unavailable

 

                    Feola, T Zaynab PA   Unavailable         Unavailable

 

                    Feola, T Zaynab PA   Unavailable         Unavailable

 

                    Feola, T Zaynab PA   Unavailable         Unavailable

 

                    Feola, T Zaynab PA   Unavailable         Unavailable

 

                    Ali, Mohsin MD     Unavailable         Unavailable

 

                    Ali, Mohsin MD     Unavailable         Unavailable

 

                    Ali, Mohsin MD     Unavailable         Unavailable

 

                    Ali, Mohsin MD     Unavailable         Unavailable

 

                    Ali, Mohsin MD     Unavailable         Unavailable

 

                    Ali, Mohsin MD     Unavailable         Unavailable

 

                    Ali, Mohsin MD     Unavailable         Unavailable

 

                    Ali, Mohsin MD     Unavailable         Unavailable

 

                    Ali, Mohsin MD     Unavailable         Unavailable

 

                    Ali, Mohsin MD     Unavailable         Unavailable

 

                    Ali, Mohsin MD     Unavailable         Unavailable

 

                    Ali, Mohsin MD     Unavailable         Unavailable

 

                    Ali, Mohsin MD     Unavailable         Unavailable

 

                    Ali, Mohsin MD     Unavailable         Unavailable

 

                    Ali, Mohsin MD     Unavailable         Unavailable

 

                    Ali, Mohsin MD     Unavailable         Unavailable

 

                    Ali, Mohsin MD     Unavailable         Unavailable

 

                    Ali, Mohsin MD     Unavailable         Unavailable

 

                    Ali, Mohsin MD     Unavailable         Unavailable

 

                    Ali, Mohsin MD     Unavailable         Unavailable

 

                    Ali, Mohsin MD     Unavailable         Unavailable

 

                    Ali, Mohsin MD     Unavailable         Unavailable

 

                    Ali, Mohsin MD     Unavailable         Unavailable

 

                    Ali, Mohsin MD     Unavailable         Unavailable

 

                    Ali, Mohsin MD     Unavailable         Unavailable

 

                    Ali, Mohsin MD     Unavailable         Unavailable

 

                    Ali, Mohsin MD     Unavailable         Unavailable

 

                    Ali, Mohsin MD     Unavailable         Unavailable

 

                    Ali, Mohsin MD     Unavailable         Unavailable

 

                    Ali, Mohsin MD     Unavailable         Unavailable

 

                    Ali, Mohsin MD     Unavailable         Unavailable

 

                    Ali, Mohsin MD     Unavailable         Unavailable

 

                    Ali, Mohsin MD     Unavailable         Unavailable

 

                    Ali, Mohsin MD     Unavailable         Unavailable

 

                    Ali, Mohsin MD     Unavailable         Unavailable

 

                    Ali, Mohsin MD     Unavailable         Unavailable

 

                    Ali, Mohsin MD     Unavailable         Unavailable

 

                    Ali, Mohsin MD     Unavailable         Unavailable

 

                    Ali, Mohsin MD     Unavailable         Unavailable

 

                    Ali, Mohsin MD     Unavailable         Unavailable

 

                    Ali, Mohsin MD     Unavailable         Unavailable

 

                    Ali, Mohsin MD     Unavailable         Unavailable

 

                    Ali, Mohsin MD     Unavailable         Unavailable

 

                    Ali, Mohsin MD     Unavailable         Unavailable

 

                    Ali, Mohsin MD     Unavailable         Unavailable

 

                    Ali, Mohsin MD     Unavailable         Unavailable

 

                    Ali, Mohsin MD     Unavailable         Unavailable

 

                    Ali, Mohsin MD     Unavailable         Unavailable

 

                    Ali, Mohsin MD     Unavailable         Unavailable

 

                    Ali, Mohsin MD     Unavailable         Unavailable

 

                    Ali, Mohsin MD     Unavailable         Unavailable

 

                    PICKERAL JR, J GRACIA PA-C Unavailable         Unavailable

 

                    PICKERAL JR, J GRACIA PA-C Unavailable         Unavailable

 

                    PICKERAL JR, J GRACIA PA-C Unavailable         Unavailable

 

                    PICKERAL JR, J GRACIA PA-C Unavailable         Unavailable

 

                    PICKERAL JR, J GRACIA PA-C Unavailable         Unavailable

 

                    PICKERAL JR, J GRACIA PA-C Unavailable         Unavailable

 

                    PICKERAL JR, J GRACIA PA-C Unavailable         Unavailable

 

                    PICKERAL JR, J GRACIA PA-C Unavailable         Unavailable

 

                    PICKERAL JR, J GRACIA PA-C Unavailable         Unavailable

 

                    PICKERAL JR, J GRACIA PA-C Unavailable         Unavailable

 

                    PICKERAL JR, J GRACIA PA-C Unavailable         Unavailable

 

                    PICKERAL JR, J GRACIA PA-C Unavailable         Unavailable

 

                    PICKERAL JR, J GRACIA PA-C Unavailable         Unavailable

 

                    PICKERAL JR, J GRACIA PA-C Unavailable         Unavailable

 

                    PICKERAL JR, J GRACIA PA-C Unavailable         Unavailable

 

                    PICKERAL JR, J GRACIA PA-C Unavailable         Unavailable

 

                    PICKERAL JR, J GRACIA PA-C Unavailable         Unavailable

 

                    PICKERAL JR, J GRACIA PA-C Unavailable         Unavailable

 

                    PICKERAL JR, J GRACIA PA-C Unavailable         Unavailable

 

                    PICKERAL JR, J GRACIA PA-C Unavailable         Unavailable

 

                    PICKERAL JR, J GRACIA PA-C Unavailable         Unavailable

 

                    PICKERAL JR, J GRACIA PA-C Unavailable         Unavailable

 

                    PICKERAL JR, J GRACIA PA-C Unavailable         Unavailable

 

                    PICKERAL JR, J GRACIA PA-C Unavailable         Unavailable

 

                    PICKERAL JR, J GRACIA PA-C Unavailable         Unavailable

 

                    PICKERAL JR, J GRACIA PA-C Unavailable         Unavailable

 

                    PICKERAL JR, J GRACIA PA-C Unavailable         Unavailable

 

                    YoungKiara Nory PA-C Unavailable         Unavailable

 

                    YoungKiara Nory PA-C Unavailable         Unavailable

 

                    YoungKiara Nory PA-C Unavailable         Unavailable

 

                    YoungKiara Nory PA-C Unavailable         Unavailable

 

                    YoungKiara Nory PA-C Unavailable         Unavailable

 

                    YoungKiara Nory PA-C Unavailable         Unavailable

 

                    YoungKiara Nory PA-C Unavailable         Unavailable

 

                    Young, Kiara Nory PA-C Unavailable         Unavailable

 

                    Young, Kiara Nory PA-C Unavailable         Unavailable

 

                    Young, Kiara Nory PA-C Unavailable         Unavailable

 

                    Young, Kiara Nory PA-C Unavailable         Unavailable

 

                    Young, Kiara Nory PA-C Unavailable         Unavailable

 

                    Yuong, Kiara Nory PA-C Unavailable         Unavailable

 

                    Young, Kiara Nory PA-C Unavailable         Unavailable

 

                    Young, Kiara Nory PA-C Unavailable         Unavailable

 

                    Young, Kiara Nory PA-C Unavailable         Unavailable

 

                    Young, Kiara Nory PA-C Unavailable         Unavailable

 

                    Young, Kiara Nory PA-C Unavailable         Unavailable

 

                    Young, Kiara Nory PA-C Unavailable         Unavailable

 

                    Young, Kiara Nory PA-C Unavailable         Unavailable

 

                    Young, Kiara Nory PA-C Unavailable         Unavailable

 

                    Young, Kiara Nory PA-C Unavailable         Unavailable

 

                    Young, Kiara Nory PA-C Unavailable         Unavailable

 

                    Young, Kiara Nory PA-C Unavailable         Unavailable

 

                    Young, Kiara Nory PA-C Unavailable         Unavailable

 

                    DESJARLAIS,  NIGHAT NP Unavailable         Unavailable

 

                    DESJARLAIS,  NIGHAT NP Unavailable         Unavailable

 

                    DESJARLAIS,  NIGHAT NP Unavailable         Unavailable

 

                    DESJARLAIS,  NIGHAT NP Unavailable         Unavailable

 

                    DESJARLAIS,  NIGHAT NP Unavailable         Unavailable

 

                    DESJARLAIS,  NIGHAT NP Unavailable         Unavailable

 

                    DESJARLAIS,  NIGHAT NP Unavailable         Unavailable

 

                    DESJARLAIS,  NIGHAT NP Unavailable         Unavailable

 

                    DESJARLAIS,  NIGHAT NP Unavailable         Unavailable

 

                    DESJARLAIS,  NIGHAT NP Unavailable         Unavailable



                                  



Re-disclosure Warning

          The records that you are about to access may contain information from 
federally-assisted alcohol or drug abuse programs. If such information is 
present, then the following federally mandated warning applies: This information
has been disclosed to you from records protected by federal confidentiality 
rules (42 CFR part 2). The federal rules prohibit you from making any further 
disclosure of this information unless further disclosure is expressly permitted 
by the written consent of the person to whom it pertains or as otherwise 
permitted by 42 CFR part 2. A general authorization for the release of medical 
or other information is NOT sufficient for this purpose. The Federal rules 
restrict any use of the information to criminally investigate or prosecute any 
alcohol or drug abuse patient.The records that you are about to access may 
contain highly sensitive health information, the redisclosure of which is 
protected by Article 27-F of the [facility] Public Health law. If you 
continue you may have access to information: Regarding HIV / AIDS; Provided by 
facilities licensed or operated by the [facility] Office of Mental Health; 
or Provided by the [facility] Office for People With Developmental 
Disabilities. If such information is present, then the following [place] 
mandated warning applies: This information has been disclosed to you from 
confidential records which are protected by state law. State law prohibits you 
from making any further disclosure of this information without the specific 
written consent of the person to whom it pertains, or as otherwise permitted by 
law. Any unauthorized further disclosure in violation of state law may result in
a fine or snf sentence or both. A general authorization for the release of 
medical or other information is NOT sufficient authorization for further disc
losure.                                                                         
    



Family History

          



             Family Member Name Family Member Gender Family Member Status Date o

f Status 

Description                             Data Source(s)

 

           Unknown    Unknown    Problem                          [facility] ([facility])



                                                                                
       



Encounters

          



           Encounter  Providers  Location   Date       Indications Data Source(s

)

 

                Outpatient      Attender: Nory Young PA-C                 10/2

2021 12:40:35 PM EDT - 10/25/2021

01:50:18 PM EDT                                     [facility] ([facility]

)

 

                Outpatient      Attender: Edgardo BAIRD                 10/23/20

21 08:31:49 AM EDT - 10/23/2021 

08:58:46 AM EDT                                     [facility] ([facility]

)

 

           Outpatient Attender: NIGHAT KWON NP            10/20/2021 11:

00:00 AM [facility]

 

                Outpatient      Attender: Izzy Alva  11:20:00 AM 

EDT                                                 [facility] ([facility]

)

 

           Outpatient Attender: NIGHAT KWON NP            2021 01:

00:00 PM [facility]

 

           Outpatient Attender: NIGHAT KWON NP            2021 04:

40:00 PM [facility]

 

                Outpatient      Attender: Mohsin Ali MD Main office - [facility] 

2021 02:30:00 

PM EDT                                              [facility] ([facility]

 

                Outpatient      Attender: MECHE LIMA MD Main Office    

 07/15/2021 02:15:00 PM 

EDT                                                 [facility]

 

           Outpatient Attender: NIGHAT KWON NP            2021 02:

20:00 PM [facility]

 

           Outpatient Attender: NIGHAT KWON NP            2021 01:

20:00 PM [facility]

 

           Outpatient Attender: NIGHAT KWON NP            2021 04:

00:00 PM [facility]

 

                Outpatient      Attender: GRACIA PHILLIPS JR Preet Alva 0

2021 11:20:00 AM

EDT                                                 [facility] ([facility]

)

 

           Outpatient Attender: NIGHAT KWON NP            03/10/2021 08:

28:00 AM [facility]

 

                Outpatient      Attender: REID DOBBINS NP                  12:32:17 PM EST - 2021

01:26:51 PM EST                                     [facility] ([facility]

)

 

           Outpatient Attender: NIGHAT KWON NP            2021 04:

40:00 PM [facility]

 

                Outpatient      Attender: Zaynab BAIRD                 02/15/2

021 04:42:41 PM EST - 02/15/2021 

05:29:27 PM EST                                     [facility] ([facility]

)

 

                Outpatient      Attender: Zaynab BAIRD                 02/10/2

021 04:49:56 PM EST - 02/10/2021 

05:44:24 PM EST                                     [facility] ([facility]

)

 

           Outpatient Attender: NIGHAT KWON NP            2021 04:

40:00 PM [facility]

 

                Outpatient      Attender: MECHE LIMA MD Main Office    

 2021 01:15:00 PM 

EST                                                 [facility]

 

           Outpatient Attender: NIGHAT KWON NP            12/10/2020 04:

40:00 PM [facility]

 

           Outpatient Attender: NIGHAT KWON NP            10/23/2020 04:

40:00 PM [facility]

 

                Outpatient      Attender: Mohsin Ali MD Main office [facility] 

10/23/2020 09:30:00 

AM EDT                                              [facility] ([facility]

 

                Outpatient      Attender: MECHE LIMA MD Main Office    

 10/22/2020 10:15:00 AM 

EDT                                                 [facility] ([facility]



                                                                                
                                                                                
                                                                                
                                                                 



Immunizations

          



             Vaccine      Date         Status       Description  Data Source(s)

 

             COVID-19 VACCINE Pfizer 2021 12:00:00 AM EDT completed       

          [facility]

 

                                        Vaccine Series Complete: YESThis Data wa

s Submitted to [facility] Via [employer]. 

 

             COVID-19 VACCINE Pfizer 2021 12:00:00 AM EST completed       

          [facility]

 

                                        Vaccine Series Complete: NOThis Data was

 Submitted to [facility] [employer]. 

 

                                        This CVX code allows reporting of a vacc

ination when formulation is unknown (for

example, when recording a Influenza vaccination when noted on a vaccination 
card)           2021 09:45:00 AM EST completed                       [facility]

T ([facility])



                                                                                
                           



Medications

          



          Medication Brand Name Start Date Product Form Dose      Route     Admi

nistrative 

Instructions Pharmacy Instructions Status     Indications Reaction   Description

 Data 

Source(s)

 

                          14 ACTUAT fluticasone furoate 0.1 MG/ACTUAT Dry Powder

 Inhaler [Arnuity] Arnuity

 Ellipta 2020 12:00:00 AM EST             RESP[facility]ATORY             active  

                 [facility] 

([facility])

 

                          120 ACTUAT mometasone furoate 0.1 MG/ACTUAT Metered Do

se Inhaler [Asmanex] 

Asmanex HFA 10/27/2020 12:00:00 AM EDT             RESPIRATORY             compl

eted                   

[facility] ([facility])

 

        zonisamide 50 MG Oral Capsule Zonisamide 10/23/2020 12:00:00 AM EDT     

            ORAL             

             active                                              [facility] ([facility] )

 

          Flonase Allergy Relief Flonase Allergy Relief 10/22/2020 12:00:00 AM E

DT                                

                      completed                                   [facility] ([facility]

 

                          14 ACTUAT fluticasone furoate 0.1 MG/ACTUAT Dry Powder

 Inhaler [Arnuity] Arnuity

 Ellipta 10/22/2020 12:00:00 AM EDT             RESPIRATORY             complete

d                   [facility] 

([facility])



                                                                                
                            



Insurance Providers

          



             Payer name   Policy type / Coverage type Policy ID    Covered party

 ID Covered 

party's relationship to chung Policy Chung             Plan Information

 

          BLUE CROSS           [id number]                             [id number]

[id number]

 

          [facility] 010/510           [id number]           2          

       [id number]

 

                [employer] BCBS   Health Maintenance Organization (HMO) [id number]

[id number]    

[id number]                                         

[id number]

 

          [employer] [place] 010/510           [id number]           2          

       [id number]

 

          BC/BS Of [facility]           [id number]    Family Depende

nt            

 

          [employer] Commercial           [id number]     Family Dependent  

          

 

                BS [employer]/MX Medigap Part B  [id number]    

..[id number] Family Dependent                        

[id number]

 

                BS [employer] Trad/MX Commercial      [id number]    

..[id number] Family Dependent                        

[id number]

 

          [employer] [place] 010/510           [id number]           2          

       [id number]

 

          [employer] [id number]           Spouse 

             [id number]

 

          [employer] Commercial [employer] Co. [id number]           Spouse 

             [id number]

 

                [employer] [place] Commercial      [id number]       

.[id number] Family Dependent                        

[id number]

 

          BCBS                .[id number] Blue Cross/Bl

ue Shield           

..[id number]

 

          [place]              .[id number] Commercial Insur

ance Co.           

.[id number]

 

          [employer]           [id number]           SPO         

        [id number]

 

                [employer] [place] Commercial      [id number]       

.[id number] Family Dependent                        

[id number]

 

                [employer] [place] Commercial      [id number]       

.[id number] Family Dependent                        

[id number]

 

          SELF PAY ONLY           0                   SP                  0

 

                [facility]      [id number]       

[id number] Family Dependent                        

[id number]

 

                [facility] [employer]      [id number]       

[id number] Family Dependent                        

[id number]

 

                    O         UNAVAILABLE                               UNAVAILA

BLE

 

          BCBS [place] WATN /307           [id number]           HU2      

           [id number]

 

          [employer] B         [id number] P                   D

[id number]

 

          [employer]BS [place] WATN /307           [id number]           HU2      

           [id number]

 

          BCBS[employer] [employer] Commercial           [id number]     Family Dependent           

 

 

          BS [place] Commercial           [id number]    Family Dependent    

        

 

          BLUE CROSS BLUE SHIELD-O/P           [id number]           01        

          [id number]

 

                              [id number]                               [id number]

[id number]

 

          BCBS [place] DIV           [id number]           HU2           

      [id number]

 

          [employer]           [id number]           HU2                 89

[id number]

 

          [employer]           [id number]           SPO            

     [id number]

 

          [employer]           [id number]           SPO            

     [id number]

 

          BCBS [place] DIV           [id number]           HU2           

      [id number]

 

          [employer]           [id number]           SP                  89

[id number]

 

          [facility] O         [id number] S                   89

[id number]

 

          BCBS [place] DIV           [id number]           HU2            

     [id number]

 

          BCBS [facility] 010/510           [id number]           HU2          

       [id number]

 

                [facility] [employer]      [id number]       

[id number] Family Dependent                        

[id number]



                                                                                
                                                                                
                                                                                
                                                                                
                                                                                
                                                          



Problems, Conditions, and Diagnoses

          



           Code       Display Name Description Problem Type Effective Dates Data

 Source(s)

 

             G47.00       Insomnia, unspecified INSOMNIA, UNSPECIFIED Diagnosis 

   2021 01:00:00

 PM [facility]

 

                    F43.23              Adjustment disorder with mixed anxiety a

nd depressed mood ADJUSTMENT 

DISORDER WITH MIXED ANXIETY AND DEPRESS Diagnosis           2021 04:40:00 

PM [facility]

 

                    F90.1               Attention-deficit hyperactivity disorder

, predominantly hyperactive type 

ATTN-DEFCT HYPERACTIVITY DISORDER, PREDOM HYPERACT Diagnosis                  04:40:00

 PM [facility]

 

             F41.1        Generalized anxiety disorder GENERALIZED ANXIETY DISOR

MEE Diagnosis    

2021 04:40:00 PM [facility]

 

                    F90.9               Attention-deficit hyperactivity disorder

, unspecified type ATTENTION-

DEFICIT HYPERACTIVITY DISORDER, UNSPECIFIED TYPE Diagnosis                  02:20:00 

PM [facility]



                                                                                
                                                          



Surgeries/Procedures

          



             Procedure    Description  Date         Indications  Data Source(s)

 

             OFFICE OUTPATIENT VISIT 25 MINUTES              2021 12:00:00

 AM EDT              [facility] 

([facility])

 

             OFFICE OUTPATIENT VISIT 25 MINUTES              2021 12:00:00

 AM EDT              [facility] ([facility] )

 

             BRNCDILAT RSPSE SPMTRY PRE&POST-BRNCDILAT ADMN              07/15/2

021 12:00:00 AM EDT              

[facility] ([facility]

 

             OFFICE OUTPATIENT VISIT 15 MINUTES              07/15/2021 12:00:00

 AM EDT              [facility] 

([facility]

 

             OFFICE OUTPATIENT VISIT 25 MINUTES              2021 12:00:00

 AM EDT              [facility] 

([facility])

 

             BRNCDILAT RSPSE SPMTRY PRE&POST-BRNCDILAT ADMN               12:00:00 AM EST              

[facility] ([facility]

 

             PERCUTANEOUS TESTS W/ALLERGENIC EXTRACTS              2021 12

:00:00 AM EST              [facility] 

([facility]

 

             NITRIC OXIDE  GAS DETERMINATION              2021 12:0

0:00 AM EST              [facility] 

([facility])

 

             INTRACUTANEOUS TESTS W/ALLERGENIC EXTRACTS              2021 

12:00:00 AM EST              [facility]

 ([facility]



                                                                                
                                                                                
        



Results

          



                    ID                  Date                Data Source

 

                    [id number]         10/23/2021 08:45:00 AM EDT [facility]









          Name      Value     Range     Interpretation Code Description Data Adelaida

rce(s) Supporting 

Document(s)

 

          SARS-CoV-2 RNA Resp Ql TAMMY+probe NOT DETECTED                         

      [facility]     

 

                                        This lab was ordered by RAUL hendricks and reported by RAUL Beckham. 









                    ID                  Date                Data Source

 

                    [id number]          2021 08:45:00 AM EDT [facility] ([facility])









          Name      Value     Range     Interpretation Code Description Data Adelaida

rce(s) Supporting 

Document(s)

 

          Lisbeth (Hep2) Laboratory test result                               [facility]

 ([facility])  

 

                                        <content>Negative   <1:80</content><br/>

<content>Borderline  

1:80</content><br/><content>Positive   >1:80</content><br/><content>ICAP 
nomenclature: AC-0</content><br/><content>For more information about Hep-2 cell 
patterns use</content><br/><content>ANApatterns.org, the official website for 
the</content><br/><content>International Consensus on Antinuclear Antibody 
(LISBETH)</content><br/><content>Patterns (ICAP).</content><br/><content>Performed 
at:   - [facility]</content><br/><content>[address] [place] [address]  [id number]</content><br/><content>: Anna Cervantes MD, Phone:  
[phone]</content><br/><content>Performed at:   - [facility] 
[place]</content><br/><content>[address], [place]  
[id number]</content><br/><content>: Araceli B Reyes MD, Phone:  
[phone]</content><br/><content></content> 









                    ID                  Date                Data Source

 

                    [id number]          2021 08:45:00 AM EDT [facility] ([facility])









          Name      Value     Range     Interpretation Code Description Data Adelaida

rce(s) Supporting 

Document(s)

 

           Phosphate [Moles/volume] in Serum or Plasma 2.1 mg/dL  2.5-4.9       

                   [facility] 

([facility])                   

 

           Erythrocyte sedimentation rate by Westergren method 49 mm/hr   0-20  

                           [facility] 

([facility])                   

 

             Creatine kinase [Enzymatic activity/volume] in Serum or Plasma 43 U

/L                            

                          [facility] ([facility])  

 

           Magnesium [Moles/volume] in Serum or Plasma 2.3 mg/dL  1.8-2.4       

                   [facility] 

[facility]                   

 

                          Thyrotropin [Units/volume] in Serum or Plasma by Detec

tion limit <= 0.05 mIU/L 

3.370 uIU/ML 0.358-3.740                            [facility]

)  

 

           Iron [Mass/volume] in Serum or Plasma 64 ug/dL                 

             [facility]                              

 

           Calcidiol [Mass/volume] in Serum or Plasma 30.4 ng/mL 30.0-100.0     

                  [facility] 

[facility]                   

 

           Cobalamin (Vitamin B12) [Mass/volume] in Serum or Plasma 432 pg/mL  2

                          

[facility]            

 

                                        VITAMIN B12 NORMAL RANGE



NORMAL                     247 - 911 PG/ML

INDETERMINATE              211 - 246 PG/ML

DEFICIENT              LESS THAN 211 PG/ML

 

 

           Rheumatoid Factor Quant Laboratory test result                       

           [facility] ([facility])                              

 

                    Cyclic citrullinated peptide IgG Ab [Units/volume] in Serum 

or Plasma 7 units             

0-19                                            [facility] ([facility])  

 

                                        <content>Negative               <20</con

tent><br/><content>Weak positive      20

 - 39</content><br/><content>Moderate positive  40 - 
59</content><br/><content>Strong positive        >59</content><br/>
<content></content> 

 

                          C reactive protein [Mass/volume] in Serum or Plasma by

 High sensitivity method 

1.29 mg/dL   0.00-0.30                              [facility] ([facility]

)  









                    ID                  Date                Data Source

 

                    [id number]            2021 06:16:00 PM EST [facility]









          Name      Value     Range     Interpretation Code Description Data Adelaida

rce(s) Supporting 

Document(s)

 

           BHD COVID-19 RT-PCR NASAL SWAB Not Detected Not Detected             

          [facility]                              

 

                                        This test has received Emergency Use Aut

horization (EUA). We willcontinue to 

follow federal and state requirements for COVID-19reporting. This test was 
developed and its performance characteristicsdetermined by [facility]. It has not been cleared orapproved by the U.S. Food and Drug 
Administration but has been givenemergency use authorization. Results should be 
used in conjunctionwith clinical findings and should not form the sole basis for
 adiagnosis or treatment decision. Methods: SARS-CoV-2 Multiplex RT-PCRAssayA 
not detected (negative) test result for this test means that SARS-CoV-2 RNA was 
not present in the specimen above the limit ofdetection. Laboratory test results
 should always be considered in thecontext of clinical observations and 
epidemiological data in making afinal diagnosis and patient management 
decisions. Results will bereported to government agencies as required. 









                    ID                  Date                Data Source

 

                    [id number]            2021 01:00:00 PM EST [facility]









          Name      Value     Range     Interpretation Code Description Data Adelaida

rce(s) Supporting 

Document(s)

 

                                        SARS coronavirus 2 RNA [Presence] in Res

piratory specimen by TAMMY with probe 

detection  NEGATIVE                                    [facility]      

 

                                        This lab was ordered by [facility] and reported by [facility]. 









                    ID                  Date                Data Source

 

                    [id number]       2021 12:00:00 AM EST [facility]









          Name      Value     Range     Interpretation Code Description Data Adelaida

rce(s) Supporting 

Document(s)

 

          [facility] Rapid COVID Antigen Test Negative                           

     [facility]     

 

                                        This lab was reported by [facility] Nichole abdullahi. 









                    ID                  Date                Data Source

 

                    [id number]            02/10/2021 12:00:00 AM EST [facility]









          Name      Value     Range     Interpretation Code Description Data Adelaida

rce(s) Supporting 

Document(s)

 

                                        SARS coronavirus 2 RNA [Presence] in Res

piratory specimen by TAMMY with probe 

detection  NEGATIVE                                    [facility]      

 

                                        This lab was ordered by [facility]

[facility] and reported by [employer]. 









                    ID                  Date                Data Source

 

                    [id number]       02/10/2021 12:00:00 AM EST [facility]









          Name      Value     Range     Interpretation Code Description Data Adelaida

rce(s) Supporting 

Document(s)

 

          C[facility] Rapid COVID Antigen Test Negative                           

     [facility]     

 

                                        This lab was reported by [facility]

bradly. 









                    ID                  Date                Data Source

 

                    [id number]          2021 12:24:00 PM EST [facility] ([facility])









          Name      Value     Range     Interpretation Code Description Data Adelaida

rce(s) Supporting 

Document(s)

 

           Phosphate [Moles/volume] in Serum or Plasma 2.2 mg/dL  2.5-4.9       

                   [facility] 

[facility]                   

 

           Magnesium [Moles/volume] in Serum or Plasma 2.2 mg/dL  1.8-2.4       

                   [facility] 

([facility])                   

 

             Creatine kinase [Enzymatic activity/volume] in Serum or Plasma 63 U

/L                            

                          [facility] ([facility])  

 

           Cobalamin (Vitamin B12) [Mass/volume] in Serum or Plasma 410 pg/mL  2

                          

[facility] ([facility])            

 

                                        VITAMIN B12 NORMAL RANGE



NORMAL                     247 - 911 PG/ML

INDETERMINATE              211 - 246 PG/ML

DEFICIENT              LESS THAN 211 PG/ML

 

 

           Calcidiol [Mass/volume] in Serum or Plasma 47.0 ng/mL 30.0-100.0     

                  [facility] 

([facility])                   









                    ID                  Date                Data Source

 

                    [id number]          2020 08:50:00 PM EDT [facility])









          Name      Value     Range     Interpretation Code Description Data [facility]

rce(s) Supporting 

Document(s)

 

                          Natriuretic peptide.B prohormone N-Terminal [Mass/volu

me] in Serum or Plasma 10 

pg/mL                                               [facility] ([facility]

)  

 

           Thyroxine (T4) Ab [Units/volume] in Serum 8.9 ug/dL  4.5-12.0        

                 [facility] 

([facility])                   

 

                          Thyrotropin [Units/volume] in Serum or Plasma by Detec

tion limit <= 0.05 mIU/L 

2.670 uIU/ML 0.358-3.740                            [facility] ([facility]

)  

 

           HCG Serum Qualitative Laboratory test result                         

         [facility] ([facility])

                                         

 

           Bacteria identified in Blood by Culture Laboratory test result       

                           [facility] 

([facility])                   

 

                                        No growth after 72 hours . All specimens

 observed

for 5 days. Results final at that time.



No growth after 48 hours . All specimens observed

for 5 days. Results final at that time.



No growth after 24 hours . All specimens observed

for 5 days. Results final at that time.



NO GROWTH AFTER 5 DAYS

 









                    ID                  Date                Data Source

 

                    [id number]          2020 08:50:00 PM EDT [facility] ([facility]









          Name      Value     Range     Interpretation Code Description Data Adelaida

e(s) Supporting 

Document(s)

 

          Glucose, Fasting 88 mg/dL                          [facility]  

 

          Blood Urea Nitrogen 9 mg/dL   7-18                          [facility] ([facility])  

 

          Creatinine For GFR 0.90 mg/dL 0.55-1.30                     [facility] ([facility])  

 

           Glomerular Filtration Rate Laboratory test result                    

              [facility]                              

 

                                        <content>Units are mL/min/1.73 

m2</content><br/><content></content><br/><content>Chronic Kidney Disease Staging
 per NKF:</content><br/><content></content><br/><content>Stage I & II   GFR >=60
       Normal to Mildly Decreased</content><br/><content>Stage III      GFR 30-
59      Moderately Decreased</content><br/><content>Stage IV       GFR 15-29    
  Severely Decreased</content><br/><content>Stage V        GFR <15        Very 
Little GFR Left</content><br/><content>ESRD           GFR <15 on 
RRT</content><br/><content></content> 

 

          Sodium Level 137 meq/L 136-145                       [facility]  

 

          Potassium Serum 4.0 meq/L 3.5-5.1                       [facility] ([facility])  

 

          Carbon Dioxide Level 24 meq/L  21-32                         [facility] ([facility])  

 

          Anion Gap 6 meq/L   8-16                          [facility] ([facility])  

 

          Chloride Level 107 meq/L                         [facility] ([facility])  

 

          Calcium Level 9.2 mg/dL 8.5-10.1                      [facility] ([facility])  









                    ID                  Date                Data Source

 

                    [id number]          2020 08:50:00 PM EDT [facility] ([facility])









          Name      Value     Range     Interpretation Code Description Data Adelaida

rc(s) Supporting 

Document(s)

 

          Alt/SGPT  23 U/L    12-78                         [facility]  

 

          Ast/Sgot  11 U/L    7-37                          [facility] ([facility]  

 

           Bilirubin,Direct Laboratory test result 0.0-0.2                      

    [facility]                              

 

          Alkaline Phosphatase 152 U/L                           [facility] ([facility])  

 

          Bilirubin,Total 0.2 mg/dL 0.2-1.0                       [facility] ([facility])  

 

          Total Protein 7.5 GM/DL 6.4-8.2                       [facility] ([facility])  

 

          Albumin   3.2 GM/DL 3.2-5.2                       [facility]  

 

          Albumin/Globulin Ratio 0.7       1.2-2.2                       [facility] 

([facility])  









                    ID                  Date                Data Source

 

                    [id number]          2020 08:50:00 PM EDT [facility] ([facility])









          Name      Value     Range     Interpretation Code Description Data Adelaida

rce(s) Supporting 

Document(s)

 

          CPK Creatine Phosphokinase 63 U/L                            [facility] ([facility]  

 

          MB/CK Relative Index 1.59                                    [facility] ([facility])  

 

                                        <content>DIAGNOSIS CRITERIA</content><br

/><content>MMB ng/ml       Relative 

Index (RI)</content><br/><content>NON-AMI               < or = 5               
N/A</content><br/><content>GRAY ZONE              > 5                < or = 
4</content><br/><content>AMI                    > 5                   > 
4</content><br/><content></content> 

 

          Troponin I Laboratory test result                               [facility]

 ([facility]  

 

                                        <content>Troponin I Reference Interval f

or Siemens Seneca 

LOCI:</content><br/><content></content><br/><content>99th Percentile= 0.00-0.045
 ng/ml</content><br/><content></content><br/><content>Risk 
Stratification:</content><br/><content><= 0.10 ng/ml   Decreased Risk for 
Adverse Clinical</content><br/><content>Events.</content><br/><content>0.10-1.50
 ng/ml   Increased Risk for Adverse Clinical</content><br/><content>Events. 
Evaluation of additional</content><br/><content>criterion and/or repeat testing 
in 2-6</content><br/><content>hours is suggested to rule out 
myocardial</content><br/><content>damage.</content><br/><content>>= 1.50 ng/ml  
 Indicative of Myocardial Injury.</content><br/><content></content> 

 

          CK-MB Value Mass Laboratory test result                               

[facility] ([facility]  









                    ID                  Date                Data Source

 

                    [id number]          2020 08:50:00 PM EDT [facility] ([facility])









          Name      Value     Range     Interpretation Code Description Data Adelaida

rce(s) Supporting 

Document(s)

 

          [facility]       1.13                                    [facility]  

 

                                        THERAPUTIC HUMAN INR VALUES

INDICATIONS                      NORMAL RANGES

PROPHYLAXIS/TREATMENT OF:

VENOUS THROMBOSIS                2.0-3.0

PULMONARY EMBOLISM               2.0-3.0

PREVENTION OF SYSTEMIC EMBOLISM FROM:

TISSUE HEART VALVES              2.0-3.0

ACUTE MYOCARDIAL INFARCTION      2.0-3.0

VALVULAR HEART DISEASE           2.0-3.0

ATRIAL FIBRILLATION              2.0-3.0

MECHANICAL VALVES(HIGH RISK)     2.5-3.5

RECURRENT MYOCARDIAL INFARCTION  2.5-3.5

 

 

          Prothrombin Time 14.7 s    12.5-14.3                     [facility]  









                    ID                  Date                Data Source

 

                    [id number]          2020 08:50:00 PM [facility]









          Name      Value     Range     Interpretation Code Description Data Adelaida

rce(s) Supporting 

Document(s)

 

          White Blood Count 8.9 10    4.0-10.0                      [facility] ([facility])  

 

          Hemoglobin 13.1 g/dL 12.0-15.5                     [facility]  

 

          Hematocrit 39.6 %    36.0-47.0                     [facility]  

 

          Red Blood Count 4.54 10   4.00-5.40                     [facility]  

 

          Mean Corpuscular Hemoglobin 28.9 pg   27.0-33.0                     [facility]

[facility] ([facility] 

 

 

          Mean Corpuscular HGB Conc 33.1 g/dL 32.0-36.5                     [facility]

[facility] ([facility]) 

 

 

          Mean Corpuscular Volume 87.2 fl   80.0-96.0                     [facility]  

 

          Red Cell Distribution Width 13.2 %    11.5-14.5                     [facility]  

 

          Platelet Count, Automated 411 10    150-450                       [facility]

[facility] ([facility]  

 

          Neutrophils % 66.4 %    36.0-66.0                     [facility] ([facility])  

 

          Mono %    5.8 %     0.0-5.0                       [facility] ([facility]  

 

          Lymph %   26.2 %    24.0-44.0                     [facility] ([facility]  

 

          Eos %     0.9 %     0.0-3.0                       [facility] ([facility]  

 

          Baso %    0.4 %     0.0-1.0                       [facility] ([facility]  

 

          Immature Granulocyte % 0.3 %     0-3.0                         [facility] 

([facility]  

 

          Neutrophils # 5.9 10    1.5-8.5                       [facility] ([facility])  

 

          Nucleated Red Blood Cell % 0.0 %     0-0                           [facility]

[facility] ([facility]  

 

          Mono #    0.5 10    0.0-0.8                       [facility] ([facility])  

 

          Lymph #   2.3 10    1.5-5.0                       [facility] ([facility]  

 

          Eos #     0.1 10    0.0-0.5                       [facility] ([facility]  

 

          Baso #    0.0 10    0.0-0.2                       [facility] ([facility]  









                    ID                  Date                Data Source

 

                    [id number]          2020 08:45:00 PM EDT [facility] ([facility])









          Name      Value     Range     Interpretation Code Description Data Adelaida

rce(s) Supporting 

Document(s)

 

          Blood Culture Laboratory test result                               [facility]  

 

                                        No growth after 72 hours . All specimens

 observed

for 5 days. Results final at that time.



No growth after 48 hours . All specimens observed

for 5 days. Results final at that time.



No growth after 24 hours . All specimens observed

for 5 days. Results final at that time.



NO GROWTH AFTER 5 DAYS

 







                                        Procedure

 

                                          



                                                                                
                                                                                
                                                                                
        



Social History

          



           Code       Duration   Value      Status     Description Data Source(s

)

 

             Smoking      07/15/2021 12:00:00 AM EDT Patient has never smoked co

mpleted    Patient 

has never smoked                        [facility] ([facility]

)



                                                                                
                  



Vital Signs

          



                    ID                  Date                Data Source

 

                    UNK                                      









           Name       Value      Range      Interpretation Code Description Data

 Source(s)

 

           Systolic blood pressure 110 mm[Hg]                       110 mm[Hg] M

[facility] ([facility])

 

                                        RT Arm 

 

           Diastolic blood pressure 80 mm[Hg]                        80 mm[Hg]  

[facility] ([facility])

 

                                        RT Arm 

 

           Heart rate 100 /min                         100 /min   [facility] ([facility])

 

           Body height 64.25 [in_i]                       64.25 [in_i] [facility]

 

                                        5'4.25" 

 

           Body weight 256.25 [lb_av]                       256.25 [lb_av] [facility]

[facility] ([facility])

 

           Body mass index (BMI) [Ratio] 43.6 kg/m2                       43.6 k

g/m2 [facility]

 

           Body mass index (BMI) [Ratio] 43.1 kg/m2                       43.1 k

g/m2 [facility]

 

           Diastolic blood pressure 76 mm[Hg]                        76 mm[Hg]  

[facility]

 

           Body weight 251.12 [lb_av]                       251.12 [lb_av] [facility]

[facility] ([facility]

 

           Body height 64 [in_i]                        64 [in_i]  [facility] ([facility]

 

                                        5'4" 

 

           Heart rate 128 /min                         128 /min   [facility]

 

           Respiratory rate 20 /min                          20 /min    [facility] (

[facility])

 

           Systolic blood pressure 108 mm[Hg]                       108 mm[Hg] 

[facility] ([facility]

 

           Body height 64.25 [in_i]                       64.25 [in_i] [facility]

 

                                        5'4.25" 

 

           Diastolic blood pressure 60 mm[Hg]                        60 mm[Hg]  

[facility] ([facility])

 

           Systolic blood pressure 100 mm[Hg]                       100 mm[Hg] 

[facility] ([facility])

 

           Body weight 254.00 [lb_av]                       254.00 [lb_av] [facility]

T ([facility])

 

           Body mass index (BMI) [Ratio] 43.3 kg/m2                       43.3 k

g/m2 [facility]

 

           Heart rate 114 /min                         114 /min   [facility]

 

           Body weight 247.12 [lb_av]                       247.12 [lb_av] [facility]

[facility] ([facility]

 

           Body height 64 [in_i]                        64 [in_i]  [facility] ([facility]

 

                                        5'4" 

 

           Respiratory rate 18 /min                          18 /min    [facility] (

[facility])

 

           Systolic blood pressure 97 mm[Hg]                        97 mm[Hg]  M

[facility] ([facility]

 

           Diastolic blood pressure 67 mm[Hg]                        67 mm[Hg]  

[facility] ([facility]

 

           Body mass index (BMI) [Ratio] 42.4 kg/m2                       42.4 k

g/m2 [facility] ([facility]

 

           Heart rate 99 /min                          99 /min    [facility]

 

           Respiratory rate 16 /min                          16 /min    [facility] (

[facility])

 

           Systolic blood pressure 110 mm[Hg]                       110 mm[Hg] M

[facility] ([facility]

 

           Body weight 249.00 [lb_av]                       249.00 [lb_av] [facility]

[facility] ([facility]

 

           Body height 64 [in_i]                        64 [in_i]  [facility] ([facility]

 

                                        5'4" 

 

           Diastolic blood pressure 75 mm[Hg]                        75 mm[Hg]  

[facility] ([facility]

 

           Body mass index (BMI) [Ratio] 42.7 kg/m2                       42.7 k

g/m2 [facility] ([facility]

## 2021-11-10 NOTE — CCD
Continuity of Care Document (CCD)

                             Created on: 2021



Nora Caputo

External Reference #: [id number]

: 1980

Sex: Female



Demographics





                          Address                   [address] [place] [address] 

[place]  [address]

 

                          Home Phone                [phone]

 

                          Preferred Language        Unknown

 

                          Marital Status            Unknown

 

                          Quaker Affiliation     Unknown

 

                          Race                      White

 

                          Ethnic Group              Not  or 





Author





                          Organization              Unknown

 

                          Address                   Unknown

 

                          Phone                     Unavailable







Care Team Providers





                    Care Team Member Name Role                Phone

 

                    Ena Huff   AUTM                [phone]

 

                    Izzy Fernando  AUTM                [phone]







Problems





                                        Description

 

                                        No Information Available







Social History





                Type            Date            Description     Comments

 

                Birth Sex                       Unknown          

 

                ETOH Use                        Denies alcohol use  

 

                Tobacco Use     Start: Unknown  Patient has never smoked  







Allergies, Adverse Reactions, Alerts





             Active Allergies Criticality  Reaction | Severity Comments     Date

 

             Ceftin       Unable to assess criticality              Ha          

 11/10/2015

 

             Oxycodone    Unable to assess criticality              anxiety     

 11/10/2015

 

                                        Inactive Allergies

 

             NKDA         Unable to assess criticality                          

 11/10/2015







Medications





           Active Medications SIG        Qnty       Indications Ordering Provide

r Date

 

                          Fluconazole                     150mg Tablets         

          Take 1 Tablet By

Mouth Today. Repeat In 2 Days 2tabs                           JUANITA Carcamo

 2020

 

                          Famotidine                     40mg Tablets           

        Take 1 Tablet By 

Mouth Every Day 90tabs                          Kranthi Oviedo MD 2020

 

                Nebulizer                      Device                   use as d

irected Cristiane Thao FNP       2020

 

                          Nebulizer Kit/Tubing/Mouthpiece                      K

it                   use 

four times a day dx j44.9 Cristiane Thao FNP 

 

                          Omeprazole                     40mg Capsules DR       

            Take 1 Capsule

By Mouth Twice Daily 60caps                          JUANITA Carcamo 20

17

 

                          Zyrtec Allergy                     10mg Tablets       

            1 by mouth 

every day prn                                   JUANITA Jimenez 2016

 

             Tums                     500mg Chewtabs                   as needed

                              JUANITA Rogers                               2016

 

           Mirena                     20mcg/24HR IUD                            

                        Unknown    

2016

 

                          Xarelto                     20mg Tablets              

     Take 1 Tablet By 

Mouth Every Day 30tabs                          Jaqueline Valdez M.D. 20

16

 

                                        Hyoscyamine Sulfate ER                  

   0.375mg Tablets ER 12HR              

                Take 1 Tablet By Mouth Every 12 Hours as Directed 60tabs        

                  Izzy Fernando, 

[facility]                                     2016

 

                          Zofran                     4mg Tablets                

   1 by mouth every 6 hrs.

as needed nausea 40tabs          R19.7           Steve Etienne, [facility] 2015

 

                          Lithium Carbonate                     300mg Capsules  

                 one at 

bedtime                                         Unknown         

 

                          Arnuity Ellipta                     100mcg/Act Aerosol

                   1 

inhalation daily                                 Unknown         

 

                          Vyvanse                     50mg Capsules             

      Take one capsule PO 

once daily                                      Unknown         

 

                Aimovig                     140mg/ml Solution Auto-Inject       

                                            

                          Unknown                   

 

                          Benzonatate                     100mg Capsules        

           take 1 capsule 

by mouth three times a day as needed cough 60caps                          Colli

ashley Oviedo MD 



 

                          Trazodone HCL                     100mg Tablets       

            take one 

tablet by mouth at bedtime                                 Unknown         

 

                          Frovatriptan Succinate                     2.5mg Table

ts                   take 

1 tablet by mouth two times a day for 7 days before menses                      

           Unknown         



 

                          Levalbuterol Tartrate                     45mcg/Act Ae

rosol                   

inhale 2 puffs by mouth four times a day                                 Unknown

         

 

                          Wellbutrin SR                     150mg Tablets ER 12H

R                   by 

mouth bid a day as directed                                 Unknown         

 

                          Risperidone                     0.5mg Tablets         

          1 by mouth Twice

A Day                                           Unknown         

 

                          Clonazepam                     0.5mg Tablets          

         1 by mouth Twice 

a day                                           Unknown         

 

                          Montelukast Sodium                     10mg Tablets   

                one at 

bedtime         30tabs                          OLI Sahni JR 0

000







Medications Administered in Office





           Medication SIG        Qnty       Indications Ordering Provider Date

 

                                        Immunization Adminstration,1 Vaccine/Tox

oid                      Injection      

                                                    Steve Etienne, [facility] 2019







Immunizations





             CPT Code     Status       Date         Vaccine      Lot #

 

             U-Flu        Given        2021   Influenza,Unspecified  

 

             [id number]        Given        2019   Adacel- Tetanus Diphtheria P

ertussis (Age65 & Under) 

[id number]

 

                                          

 

                [id number]           Refused         10/19/2018      Influenza Virus 

Vaccine, Quadrivalent (Cciiv4), Derived

From Cell                                

 

                10423           Refused         10/09/2017      Influenza Vaccin

e Quadrivalent Preser/Antibiotic Free 

Im Use                                   

 

                74127           Refused         10/09/2017      Influenza Vaccin

e Quadrivalent Preser/Antibiotic Free 

Im Use                                   







Vital Signs





                Date            Vital           Result          Comment

 

                2021 11:29am BP Systolic     100 mmHg         

 

                    BP Diastolic        60 mmHg              

 

                    Height              64.25 inches        5'4.25"

 

                    Weight              254.00 lb            

 

                    BMI (Body Mass Index) 43.3 kg/m2           

 

                2020  1:56pm BP Systolic     102 mmHg        RT Arm

 

                    BP Diastolic        60 mmHg             RT Arm

 

                    Heart Rate          108 /min             

 

                    Height              64.25 inches        5'4.25"

 

                    Weight              247.00 lb            

 

                    BMI (Body Mass Index) 42.1 kg/m2           







Results





        Test    Acquired Date Facility Test    Result  H/L     Range   Note

 

                    Laboratory test finding 2021          [address] [address] [phone] Erythrocyte Sedimentation Rate 49 mm/hr   High       0

-20        

 

             Phosphorus Level 2.1 mg/dL    Low          2.5-4.9       

 

             CPK Creatine Phosphokinase 43 U/L       Normal               

 

             Magnesium Level 2.3 mg/dL    Normal       1.8-2.4       

 

             Iron (Fe)    64 g/dL    Normal               

 

             Thyroid Stimulating Hormone 3.370 uIU/ML Normal       0.358-3.740  

 

 

             Vitamin B12 Level 432 pg/mL    Normal       247-911      1

 

             Total 25(Oh) Vitamin D 30.4 NG/ML   Normal       30.0-100.0    

 

             Rheumatoid Factor Quant < 10.0 IU/mL Normal       <15.0         

 

             C Reactive Protein Quantitativ 1.29 mg/dL   High         0.00-0.30 

    







                          1                         VITAMIN B12 NORMAL RANGE



NORMAL                     247 - 911 PG/ML

INDETERMINATE              211 - 246 PG/ML

DEFICIENT              LESS THAN 211 PG/ML









Procedures





                Date            Code            Description     Status

 

                2021      16330           Office/Outpatient Established Mo

d MDM 30-39 Min Completed







Medical Devices





                                        Description

 

                                        No Information Available







Encounters





           Type       Date       Location   Provider   Dx         Diagnosis

 

                Office Visit    2021 11:20a [facility] Jackson Frazier JR, 

[place]                        K58.0                     Irritable bowel syndrome wit

h diarrhea

 

                          E55.9                     Vitamin D deficiency, unspec

ified

 

                          E78.00                    Pure hypercholesterolemia, u

nspecified

 

                          F34.1                     Dysthymic disorder

 

                          M94.0                     Chondrocostal junction syndr

ome [Tietze]

 

                          E66.01                    Morbid (severe) obesity due 

to excess calories

 

                          Z68.41                    Body mass index [BMI] 40.0-4

4.9, adult

 

                          Z86.711                   Personal history of pulmonar

y embolism

 

                          Z79.01                    Long term (current) use of a

nticoagulants







Assessments





                Date            Code            Description     Provider

 

                2021      K58.0           Irritable bowel syndrome with di

arrhea OLI Sahni JR

 

                2021      E55.9           Vitamin D deficiency, unspecifie

d OLI Sahni JR

 

                2021      E78.00          Pure hypercholesterolemia, unspe

cified OLI Sahni JR

 

                2021      F34.1           Dysthymic disorder OLI Gaytan JR

 

                2021      M94.0           Chondrocostal junction syndrome 

[Tietze] OLI Sahni JR

 

                2021      E66.01          Morbid (severe) obesity due to e

xcess calories OLI Sahni JR

 

                2021      Z68.41          Body mass index [BMI]40.0-44.9, 

adult OLI Sahni JR

 

                2021      Z86.711         Personal history of pulmonary em

bolism OLI Sahni JR

 

                2021      Z79.01          Long term (current) use of antic

oagulants OLI Sahni JR







Plan of Treatment

Future Appointment(s):* 2021 11:20 am - JUANITA Carcamo at [facility]

2021 - OLI Sahni JR* K58.0 Irritable bowel syndrome with 
  diarrhea* Comments:* Mostly stable at this time, continue diet and exercise, 
  medications reviewed





* E55.9 Vitamin D deficiency, unspecified* Comments:* Continues on 
  supplementation at this time





* E78.00 Pure hypercholesterolemia, unspecified* Comments:* reviewed diet and 
  exercise recommendations





* F34.1 Dysthymic disorder* Comments:* Follows with psych at [facility], medications 
  reviewed and reconciled in the EMR. She gets all psych related meds there





* M94.0 Chondrocostal junction syndrome [Tietze]* Comments:* Continues to have 
  some issues, ibuprofen with relief on occasion





* E66.01 Morbid (severe) obesity due to excess calories* Comments:* Diet and 
  exercise reviewed in depth





* Z68.41 Body mass index [BMI]40.0-44.9, adult

* Z86.711 Personal history of pulmonary embolism* Comments:* Continues on 
  Xarelto





* Z79.01 Long term (current) use of anticoagulants* Comments:* No active signs 
  of bleeding









Functional Status





                                        Description

 

                                        No Information Available







Mental Status





                                        Description

 

                                        No Information Available







Referrals





                                        Description

 

                                        No Information Available

## 2021-11-10 NOTE — CCD
Continuity of Care Document (CCD)

                             Created on: 09/15/2021



Nora Caputo

External Reference #: [id number]

: 1980

Sex: Female



Demographics





                          Address                   [address] [place] [address] 

[place]  [address]

 

                          Home Phone                [phone]

 

                          Preferred Language        Unknown

 

                          Marital Status            Unknown

 

                          Jain Affiliation     Unknown

 

                          Race                      White

 

                          Ethnic Group              Not  or 





Author





                          Organization              Unknown

 

                          Address                   Unknown

 

                          Phone                     Unavailable







Care Team Providers





                    Care Team Member Name Role                Phone

 

                    Ena Huff   AUTM                [phone]

 

                    Izzy Fernando  AUTM                [phone]







Problems





                                        Description

 

                                        No Information Available







Social History





                Type            Date            Description     Comments

 

                Birth Sex                       Unknown          

 

                ETOH Use                        Denies alcohol use  

 

                Tobacco Use     Start: Unknown  Patient has never smoked  







Allergies, Adverse Reactions, Alerts





             Active Allergies Criticality  Reaction | Severity Comments     Date

 

             Ceftin       Unable to assess criticality              Ha          

 11/10/2015

 

             Oxycodone    Unable to assess criticality              anxiety     

 11/10/2015

 

                                        Inactive Allergies

 

             NKDA         Unable to assess criticality                          

 11/10/2015







Medications





           Active Medications SIG        Qnty       Indications Ordering Provide

r Date

 

                          Fluconazole                     150mg Tablets         

          Take 1 Tablet By

Mouth Today. Repeat In 2 Days 2tabs                           JUANITA Carcamo

 2020

 

                          Famotidine                     40mg Tablets           

        Take 1 Tablet By 

Mouth Every Day 90tabs                          Kranthi Oviedo MD 2020

 

                Nebulizer                      Device                   use as d

irected Cristiane Thao FNP       2020

 

                          Nebulizer Kit/Tubing/Mouthpiece                      K

it                   use 

four times a day dx j44.9 Cristiane Thao FNP 

 

                          Omeprazole                     40mg Capsules DR       

            Take 1 Capsule

By Mouth Twice Daily 60caps                          JUANITA Carcamo 20

17

 

                          Zyrtec Allergy                     10mg Tablets       

            1 by mouth 

every day prn                                   JUANITA Jimenez 2016

 

             Tums                     500mg Chewtabs                   as needed

                              JUANITA Rogers                               2016

 

           Mirena                     20mcg/24HR IUD                            

                        Unknown    

2016

 

                          Xarelto                     20mg Tablets              

     Take 1 Tablet By 

Mouth Every Day 30tabs                          Jaqueline Valdez M.D. 20

16

 

                                        Hyoscyamine Sulfate ER                  

   0.375mg Tablets ER 12HR              

                Take 1 Tablet By Mouth Every 12 Hours as Directed 60tabs        

                  Izzy Fernando, 

[facility]                                     2016

 

                          Zofran                     4mg Tablets                

   1 by mouth every 6 hrs.

as needed nausea 40tabs          R19.7           Steve Etienne, [facility] 2015

 

                          Lithium Carbonate                     300mg Capsules  

                 one at 

bedtime                                         Unknown         

 

                          Arnuity Ellipta                     100mcg/Act Aerosol

                   1 

inhalation daily                                 Unknown         

 

                          Vyvanse                     50mg Capsules             

      Take one capsule PO 

once daily                                      Unknown         

 

                Aimovig                     140mg/ml Solution Auto-Inject       

                                            

                          Unknown                   

 

                          Benzonatate                     100mg Capsules        

           take 1 capsule 

by mouth three times a day as needed cough 60caps                          Colli

ashley Oviedo MD 



 

                          Trazodone HCL                     100mg Tablets       

            take one 

tablet by mouth at bedtime                                 Unknown         

 

                          Frovatriptan Succinate                     2.5mg Table

ts                   take 

1 tablet by mouth two times a day for 7 days before menses                      

           Unknown         



 

                          Levalbuterol Tartrate                     45mcg/Act Ae

rosol                   

inhale 2 puffs by mouth four times a day                                 Unknown

         

 

                          Wellbutrin SR                     150mg Tablets ER 12H

R                   by 

mouth bid a day as directed                                 Unknown         

 

                          Risperidone                     0.5mg Tablets         

          1 by mouth Twice

A Day                                           Unknown         

 

                          Clonazepam                     0.5mg Tablets          

         1 by mouth Twice 

a day                                           Unknown         

 

                          Montelukast Sodium                     10mg Tablets   

                one at 

bedtime         30tabs                          OLI Sahni JR 0

000







Medications Administered in Office





           Medication SIG        Qnty       Indications Ordering Provider Date

 

                                        Immunization Adminstration,1 Vaccine/Tox

oid                      Injection      

                                                    Steve Etienne, [facility] 2019







Immunizations





             CPT Code     Status       Date         Vaccine      Lot #

 

             U-Flu        Given        2021   Influenza,Unspecified  

 

             [id number]        Given        2019   Adacel- Tetanus Diphtheria P

ertussis (Age63 & Under) 

[id number]

 

                                          

 

                40104           Refused         10/19/2018      Influenza Virus 

Vaccine, Quadrivalent (Cciiv4), Derived

From Cell                                

 

                [id number]           Refused         10/09/2017      Influenza Vaccin

e Quadrivalent Preser/Antibiotic Free 

Im Use                                   

 

                50016           Refused         10/09/2017      Influenza Vaccin

e Quadrivalent Preser/Antibiotic Free 

Im Use                                   







Vital Signs





                Date            Vital           Result          Comment

 

                2021 11:29am BP Systolic     100 mmHg         

 

                    BP Diastolic        60 mmHg              

 

                    Height              64.25 inches        5'4.25"

 

                    Weight              254.00 lb            

 

                    BMI (Body Mass Index) 43.3 kg/m2           

 

                2020  1:56pm BP Systolic     102 mmHg        RT Arm

 

                    BP Diastolic        60 mmHg             RT Arm

 

                    Heart Rate          108 /min             

 

                    Height              64.25 inches        5'4.25"

 

                    Weight              247.00 lb            

 

                    BMI (Body Mass Index) 42.1 kg/m2           







Results





        Test    Acquired Date Facility Test    Result  H/L     Range   Note

 

                    Laboratory test finding 2021          [facility]

                                        [address] [place] [address] [phone] Erythrocyte Sedimentation Rate 49 mm/hr   High       0

-20        

 

             Phosphorus Level 2.1 mg/dL    Low          2.5-4.9       

 

             CPK Creatine Phosphokinase 43 U/L       Normal               

 

             Magnesium Level 2.3 mg/dL    Normal       1.8-2.4       

 

             Iron (Fe)    64 g/dL    Normal               

 

             Thyroid Stimulating Hormone 3.370 uIU/ML Normal       0.358-3.740  

 

 

             Vitamin B12 Level 432 pg/mL    Normal       247-911      1

 

             Total 25(Oh) Vitamin D 30.4 NG/ML   Normal       30.0-100.0    

 

             Rheumatoid Factor Quant < 10.0 IU/mL Normal       <15.0         

 

             C Reactive Protein Quantitativ 1.29 mg/dL   High         0.00-0.30 

    

 

             Cyclic Citrullinated Peptide 7 units      Normal       0-19        

 2

 

                    Krista Titer & Pattern 2021          [facility]

nter

                                        [address] [place] [address] [phone] Krista (Hep2) Negative   Normal     .          3







                          1                         VITAMIN B12 NORMAL RANGE



NORMAL                     247 - 911 PG/ML

INDETERMINATE              211 - 246 PG/ML

DEFICIENT              LESS THAN 211 PG/ML



 

                          2                         Negative               <20

Weak positive      20 - 39

Moderate positive  40 - 59

Strong positive        >59



 

                          3                         Negative   <1:80

Borderline  1:80

Positive   >1:80

ICAP nomenclature: AC-[id number]

For more information about Hep-2 cell patterns use

ANApatterns.org, the official website for the

International Consensus on Antinuclear Antibody (KRISTA)

Patterns (ICAP).

Performed at:   - [facility]  [id number]

61

: Anna Cervantes MD, Phone:  [phone]

Performed at:   - [facility] [address]  [id number]

: Araceli B Reyes MD, Phone:  [phone]









Procedures





                Date            Code            Description     Status

 

                2021      [address]           Office/Outpatient Established Mo

d MDM 30-39 Min Completed







Medical Devices





                                        Description

 

                                        No Information Available







Encounters





           Type       Date       Location   Provider   Dx         Diagnosis

 

                Office Visit    2021 11:20a [facility] [place]

OIL Solis JR                        K58.0                     Irritable bowel syndrome wit

h diarrhea

 

                          E55.9                     Vitamin D deficiency, unspec

ified

 

                          E78.00                    Pure hypercholesterolemia, u

nspecified

 

                          F34.1                     Dysthymic disorder

 

                          M94.0                     Chondrocostal junction syndr

ome [Tietze]

 

                          E66.01                    Morbid (severe) obesity due 

to excess calories

 

                          Z68.41                    Body mass index [BMI] 40.0-4

4.9, adult

 

                          Z86.711                   Personal history of pulmonar

y embolism

 

                          Z79.01                    Long term (current) use of a

nticoagulants







Assessments





                Date            Code            Description     Provider

 

                2021      K58.0           Irritable bowel syndrome with di

arrhea OLI Sahni JR

 

                2021      E55.9           Vitamin D deficiency, unspecifie

d OLI Sahni JR

 

                2021      E78.00          Pure hypercholesterolemia, unspe

cified OLI Sahni JR

 

                2021      F34.1           Dysthymic disorder OLI Gaytan JR

 

                2021      M94.0           Chondrocostal junction syndrome 

[Tietze] OLI Sahni JR

 

                2021      E66.01          Morbid (severe) obesity due to e

xcess calories OLI Sahni JR

 

                2021      Z68.41          Body mass index [BMI]40.0-44.9, 

adult OLI Sahni JR

 

                2021      Z86.711         Personal history of pulmonary em

bolism OLI Sahni JR

 

                2021      Z79.01          Long term (current) use of antic

oagulants OLI Sahni JR







Plan of Treatment

Future Appointment(s):* 2021 11:20 am - JUANITA Carcamo at [place] 
  [facility]

2021 - OLI Sahni JR* K58.0 Irritable bowel syndrome with 
  diarrhea* Comments:* Mostly stable at this time, continue diet and exercise, 
  medications reviewed





* E55.9 Vitamin D deficiency, unspecified* Comments:* Continues on 
  supplementation at this time





* E78.00 Pure hypercholesterolemia, unspecified* Comments:* reviewed diet and 
  exercise recommendations





* F34.1 Dysthymic disorder* Comments:* Follows with psych at [facility], medications 
  reviewed and reconciled in the EMR. She gets all psych related meds there





* M94.0 Chondrocostal junction syndrome [Tietze]* Comments:* Continues to have 
  some issues, ibuprofen with relief on occasion





* E66.01 Morbid (severe) obesity due to excess calories* Comments:* Diet and 
  exercise reviewed in depth





* Z68.41 Body mass index [BMI]40.0-44.9, adult

* Z86.711 Personal history of pulmonary embolism* Comments:* Continues on 
  Xarelto





* Z79.01 Long term (current) use of anticoagulants* Comments:* No active signs 
  of bleeding









Functional Status





                                        Description

 

                                        No Information Available







Mental Status





                                        Description

 

                                        No Information Available







Referrals





                                        Description

 

                                        No Information Available

## 2021-11-10 NOTE — CCD
Continuity of Care Document (CCD)

                             Created on: 2021



Nora Caputo

External Reference #: [id number]

: 1980

Sex: Female



Demographics





                          Address                   [address] 

[place]  [address]

 

                          Home Phone                [phone]

 

                          Preferred Language        Unknown

 

                          Marital Status            Unknown

 

                          Episcopalian Affiliation     Unknown

 

                          Race                      White

 

                          Ethnic Group              Not  or 





Author





                          Author                    [facility]

 

                          Organization              Unknown

 

                          Address                   [address]

[place]  [address]



 

                          Phone                     [phone]







Care Team Providers





                    Care Team Member Name Role                Phone

 

                    Ena Huff ANP   AUTM                [phone]

 

                    Izzy Fernando  AUTM                [phone]







Problems





                                        Description

 

                                        No Information Available







Social History





                Type            Date            Description     Comments

 

                Birth Sex                       Unknown          

 

                ETOH Use                        Denies alcohol use  

 

                Tobacco Use     Start: Unknown  Patient has never smoked  







Allergies, Adverse Reactions, Alerts





             Active Allergies Criticality  Reaction | Severity Comments     Date

 

             Ceftin       Unable to assess criticality              Ha          

 11/10/2015

 

             Oxycodone    Unable to assess criticality              anxiety     

 11/10/2015

 

                                        Inactive Allergies

 

             NKDA         Unable to assess criticality                          

 11/10/2015







Medications





           Active Medications SIG        Qnty       Indications Ordering Provide

r Date

 

                          Fluconazole                     150mg Tablets         

          Take 1 Tablet By

Mouth Today. Repeat In 2 Days 2tabs                           JUANITA Carcamo

 2020

 

                          Famotidine                     40mg Tablets           

        Take 1 Tablet By 

Mouth Every Day 90tabs                          Kranthi Oviedo MD 2020

 

                Nebulizer                      Device                   use as d

irected Cristiane Thao FNP       2020

 

                          Nebulizer Kit/Tubing/Mouthpiece                      K

it                   use 

four times a day dx j44.9 Cristiane Thao FNP 

 

                          Omeprazole                     40mg Capsules DR       

            Take 1 Capsule

By Mouth Twice Daily 60caps                          JUANITA Carcamo 20

17

 

                          Zyrtec Allergy                     10mg Tablets       

            1 by mouth 

every day prn                                   Steve Etienne JOSE ENRIQUE 2016

 

             Tums                     500mg Chewtabs                   as needed

                              JUANITA Rogers                               2016

 

           Mirena                     20mcg/24HR IUD                            

                        Unknown    

2016

 

                          Xarelto                     20mg Tablets              

     Take 1 Tablet By 

Mouth Every Day 30tabs                          Jaqueline Valdez M.D. 20

16

 

                                        Hyoscyamine Sulfate ER                  

   0.375mg Tablets ER 12HR              

                Take 1 Tablet By Mouth Every 12 Hours as Directed 60tabs        

                  Izzy Fernando 

[facility]                                     2016

 

                          Zofran                     4mg Tablets                

   1 by mouth every 6 hrs.

as needed nausea 40tabs          R19.7           [facility] 2015

 

                          Lithium Carbonate                     300mg Capsules  

                 one at 

bedtime                                         Unknown         

 

                          Arnuity Ellipta                     100mcg/Act Aerosol

                   1 

inhalation daily                                 Unknown         

 

                          Vyvanse                     50mg Capsules             

      Take one capsule PO 

once daily                                      Unknown         

 

                Aimovig                     140mg/ml Solution Auto-Inject       

                                            

                          Unknown                   

 

                          Benzonatate                     100mg Capsules        

           take 1 capsule 

by mouth three times a day as needed cough 60caps                          Colli

ashley Oviedo MD 



 

                          Trazodone HCL                     100mg Tablets       

            take one 

tablet by mouth at bedtime                                 Unknown         

 

                          Frovatriptan Succinate                     2.5mg Table

ts                   take 

1 tablet by mouth two times a day for 7 days before menses                      

           Unknown         



 

                          Levalbuterol Tartrate                     45mcg/Act Ae

rosol                   

inhale 2 puffs by mouth four times a day                                 Unknown

         

 

                          Wellbutrin SR                     150mg Tablets ER 12H

R                   by 

mouth bid a day as directed                                 Unknown         

 

                    Risperidone                     0.5mg Tablets               

    1 by mouth tid       

                                        Unknown             

 

                          Clonazepam                     0.5mg Tablets          

         1 by mouth Twice 

a day                                           Unknown         

 

                          Montelukast Sodium                     10mg Tablets   

                one at 

bedtime         30tabs                          OLI Sahni JR 

000







Medications Administered in Office





           Medication SIG        Qnty       Indications Ordering Provider Date

 

                                        Immunization Adminstration,1 Vaccine/Tox

oid                      Injection      

                                                    [facility] 2019







Immunizations





             CPT Code     Status       Date         Vaccine      Lot #

 

             U-Flu        Given        2021   Influenza,Unspecified  

 

             [id number]        Given        2019   Adacel- Tetanus Diphtheria P

ertussis [id number]

 

                                          

 

                [id number]           Refused         10/19/2018      Influenza Virus 

Vaccine, Quadrivalent (Cciiv4), Derived

From Cell                                

 

                67436           Refused         10/09/2017      Influenza Vaccin

e Quadrivalent Preser/Antibiotic Free 

Im Use                                   

 

                70144           Refused         10/09/2017      Influenza Vaccin

e Quadrivalent Preser/Antibiotic Free 

Im Use                                   







Vital Signs





                Date            Vital           Result          Comment

 

                2021 11:26am BP Systolic     110 mmHg        RT Arm

 

                    BP Diastolic        80 mmHg             RT Arm

 

                    Heart Rate          100 /min             

 

                    Height              64.25 inches        5'4.25"

 

                    Weight              256.25 lb            

 

                    BMI (Body Mass Index) 43.6 kg/m2           

 

                2021 11:29am BP Systolic     100 mmHg         

 

                    BP Diastolic        60 mmHg              

 

                    Height              64.25 inches        5'4.25"

 

                    Weight              254.00 lb            

 

                    BMI (Body Mass Index) 43.3 kg/m2           







Results





        Test    Acquired Date Facility Test    Result  H/L     Range   Note

 

                    Laboratory test finding 2021          [facility]

                                        [address] [place] [address] [phone] Erythrocyte Sedimentation Rate 49 mm/hr   High       0

-20        

 

             Phosphorus Level 2.1 mg/dL    Low          2.5-4.9       

 

             CPK Creatine Phosphokinase 43 U/L       Normal               

 

             Magnesium Level 2.3 mg/dL    Normal       1.8-2.4       

 

             Iron (Fe)    64 g/dL    Normal               

 

             Thyroid Stimulating Hormone 3.370 uIU/ML Normal       0.358-3.740  

 

 

             Vitamin B12 Level 432 pg/mL    Normal       247-911      1

 

             Total 25(Oh) Vitamin D 30.4 NG/ML   Normal       30.0-100.0    

 

             Rheumatoid Factor Quant < 10.0 IU/mL Normal       <15.0         

 

             C Reactive Protein Quantitativ 1.29 mg/dL   High         0.00-0.30 

    

 

             Cyclic Citrullinated Peptide 7 units      Normal       0-19        

 2

 

                    Lisbeth Titer & Pattern 2021          [facility]

nter

                                        [address] [place] [address] [phone] Lisbeth (Hep2) Negative   Normal     .          3







                          1                         VITAMIN B12 NORMAL RANGE



NORMAL                     247 - 911 PG/ML

INDETERMINATE              211 - 246 PG/ML

DEFICIENT              LESS THAN 211 PG/ML



 

                          2                         Negative               <20

Weak positive      20 - 39

Moderate positive  40 - 59

Strong positive        >59



 

                          3                         Negative   <1:80

Borderline  1:80

Positive   >1:80

ICAP nomenclature: AC-0

For more information about Hep-2 cell patterns use

ANAmainorerns.org, the official website for the

International Consensus on Antinuclear Antibody (LISBETH)

Patterns (ICAP).

Performed at:   - [facility]  [id number]

61

: Anna Cervantes MD, Phone:  [phone]

Performed at:   - [facility] [address]  [id number]

: Araceli B Reyes MD, Phone:  [phone]









Procedures





                Date            Code            Description     Status

 

                2021      38274           Office/Outpatient Established Mo

d MDM 30-39 Min Completed

 

                2021      27254           Office/Outpatient Established Mo

d MDM 30-39 Min Completed







[employer]





                                        Description

 

                                        No Information Available







Encounters





           Type       Date       Location   Provider   Dx         Diagnosis

 

             [facility] 2021 [facility] 

G89.29                                  Other chronic pain

 

                          E55.9                     Vitamin D deficiency, unspec

ified

 

                          E78.00                    Pure hypercholesterolemia, u

nspecified

 

                          Z86.711                   Personal history of pulmonar

y embolism

 

                          Z79.01                    Long term (current) use of a

nticoagulants

 

                          F34.1                     Dysthymic disorder

 

                Office Visit    2021 11:20a [facility]                        K58.0                     Irritable bowel syndrome wit

h diarrhea

 

                          E55.9                     Vitamin D deficiency, unspec

ified

 

                          E78.00                    Pure hypercholesterolemia, u

nspecified

 

                          F34.1                     Dysthymic disorder

 

                          M94.0                     Chondrocostal junction syndr

ome [Tietze]

 

                          E66.01                    Morbid (severe) obesity due 

to excess calories

 

                          Z68.41                    Body mass index [BMI] 40.0-4

4.9, adult

 

                          Z86.711                   Personal history of pulmonar

y embolism

 

                          Z79.01                    Long term (current) use of a

nticoagulants







Assessments





                Date            Code            Description     Provider

 

                2021      G89.29          Other chronic pain Izzy Fernando

, [facility]

 

                2021      E55.9           Vitamin D deficiency, unspecifie

d Izzy Fernando, [facility]

 

                2021      E78.00          Pure hypercholesterolemia, unspe

cified Izzy Fernando, [facility]

 

                2021      Z86.711         Personal history of pulmonary em

bolism Izzy Ingramra, JUANITA

 

                2021      Z79.01          Long term (current) use of antic

oagulants Izzy JUANITA Fernando

 

                2021      F34.1           Dysthymic disorder Izzy Fernando

JUANITA

 

                2021      K58.0           Irritable bowel syndrome with di

arrhea OLI Sahni JR

 

                2021      E55.9           Vitamin D deficiency, unspecifie

d OLI Sahni JR

 

                2021      E78.00          Pure hypercholesterolemia, unspe

cified OLI Sahni JR

 

                2021      F34.1           Dysthymic disorder OLI Gaytan JR

 

                2021      M94.0           Chondrocostal junction syndrome 

[Tietze] OLI Sahni JR

 

                2021      E66.01          Morbid (severe) obesity due to e

xcess calories OLI Sahni JR

 

                2021      Z68.41          Body mass index [BMI]40.0-44.9, 

adult OLI Sahni JR

 

                2021      Z86.711         Personal history of pulmonary em

bolism OLI Sahni JR

 

                2021      Z79.01          Long term (current) use of antic

oagulants OLI Sahni JR







Plan of Treatment

Future Appointment(s):* 2022 11:00 am - JUANITA Carcamo at [facility]

2021 - JUANITA Carcamo* G89.29 Other chronic pain* Comments:* Following
   with [facility]. CRP remains elevated, RF is negative. Dr. Rush has 
  ordered a sleep study, but the patient reports that she and Dr. Rush both be
  lieve her symptoms are caused by fibromyalgia.





* E55.9 Vitamin D deficiency, unspecified* Comments:* Not currently 
  supplemented. Last checked by rheumatology.





* E78.00 Pure hypercholesterolemia, unspecified* Comments:* Check lipids in the 
  spring at [facility]. No current pharmacotherapy. Weight loss, diet and exercise 
  discussed and reinforced.





* Z86.711 Personal history of pulmonary embolism* Comments:* On Xarelto at this 
  point.





* Z79.01 Long term (current) use of anticoagulants

* F34.1 Dysthymic disorder* Comments:* Managed by the [facility] and follows 
  there routinely; medications managed by their office.









Functional Status





                                        Description

 

                                        No Information Available







Mental Status





                                        Description

 

                                        No Information Available







Referrals





                                        Description

 

                                        No Information Available

## 2021-11-10 NOTE — CCD
Continuity of Care Document (CCD)

                             Created on: 2021



Nora Caputo

External Reference #: [id number]

: 1980

Sex: Female



Demographics





                          Address                   [address] 

[place]  [address]

 

                          Home Phone                [phone]

 

                          Preferred Language        Unknown

 

                          Marital Status            Unknown

 

                          Latter-day Affiliation     Unknown

 

                          Race                      White

 

                          Ethnic Group              Not  or 





Author





                          Author                    Nora FERNANDO [facility]

 

                          Organization              Unknown

 

                          Address                   [address]

[place]  [address]



 

                          Phone                     [phone]







Care Team Providers





                    Care Team Member Name Role                Phone

 

                    Ena Huff ANP   AUTM                [phone]

 

                    Izzy Fernando  AUTM                [phone]







Problems





                                        Description

 

                                        No Information Available







Social History





                Type            Date            Description     Comments

 

                Birth Sex                       Unknown          

 

                ETOH Use                        Denies alcohol use  

 

                Tobacco Use     Start: Unknown  Patient has never smoked  







Allergies, Adverse Reactions, Alerts





             Active Allergies Criticality  Reaction | Severity Comments     Date

 

             Ceftin       Unable to assess criticality              Ha          

 11/10/2015

 

             Oxycodone    Unable to assess criticality              anxiety     

 11/10/2015

 

                                        Inactive Allergies

 

             NKDA         Unable to assess criticality                          

 11/10/2015







Medications





           Active Medications SIG        Qnty       Indications Ordering Provide

r Date

 

                          Fluconazole                     150mg Tablets         

          Take 1 Tablet By

Mouth Today. Repeat In 2 Days 2tabs                           JUANITA Carcamo

 2020

 

                          Famotidine                     40mg Tablets           

        Take 1 Tablet By 

Mouth Every Day 90tabs                          Kranthi Oviedo MD 2020

 

                Nebulizer                      Device                   use as d

irected Cristiane Thao FNP       2020

 

                          Nebulizer Kit/Tubing/Mouthpiece                      K

it                   use 

four times a day dx j44.9 Cristiane Thao FNP 

 

                          Omeprazole                     40mg Capsules DR       

            Take 1 Capsule

By Mouth Twice Daily 60caps                          JUANITA Carcamo 20

17

 

                          Zyrtec Allergy                     10mg Tablets       

            1 by mouth 

every day prn                                   Steve Etienne JOSE ENRIQUE 2016

 

             Tums                     500mg Chewtabs                   as needed

                              JUANITA Rogers                               2016

 

           Mirena                     20mcg/24HR IUD                            

                        Unknown    

2016

 

                          Xarelto                     20mg Tablets              

     Take 1 Tablet By 

Mouth Every Day 30tabs                          Jaqueline Valdez M.D. 20

16

 

                                        Hyoscyamine Sulfate ER                  

   0.375mg Tablets ER 12HR              

                Take 1 Tablet By Mouth Every 12 Hours as Directed 60tabs        

                  Izzy Fernando 

[facility]                                     2016

 

                          Zofran                     4mg Tablets                

   1 by mouth every 6 hrs.

as needed nausea 40tabs          R19.7           Steve Etienne [facility] 2015

 

                          Lithium Carbonate                     300mg Capsules  

                 one at 

bedtime                                         Unknown         

 

                          Arnuity Ellipta                     100mcg/Act Aerosol

                   1 

inhalation daily                                 Unknown         

 

                          Vyvanse                     50mg Capsules             

      Take one capsule PO 

once daily                                      Unknown         

 

                Aimovig                     140mg/ml Solution Auto-Inject       

                                            

                          Unknown                   

 

                          Benzonatate                     100mg Capsules        

           take 1 capsule 

by mouth three times a day as needed cough 60caps                          Colli

ashley Oviedo MD 



 

                          Trazodone HCL                     100mg Tablets       

            take one 

tablet by mouth at bedtime                                 Unknown         

 

                          Frovatriptan Succinate                     2.5mg Table

ts                   take 

1 tablet by mouth two times a day for 7 days before menses                      

           Unknown         



 

                          Levalbuterol Tartrate                     45mcg/Act Ae

rosol                   

inhale 2 puffs by mouth four times a day                                 Unknown

         

 

                          Wellbutrin SR                     150mg Tablets ER 12H

R                   by 

mouth bid a day as directed                                 Unknown         

 

                    Risperidone                     0.5mg Tablets               

    1 by mouth tid       

                                        Unknown             

 

                          Clonazepam                     0.5mg Tablets          

         1 by mouth Twice 

a day                                           Unknown         

 

                          Montelukast Sodium                     10mg Tablets   

                one at 

bedtime         30tabs                          OLI Sahni JR 

000







Medications Administered in Office





           Medication SIG        Qnty       Indications Ordering Provider Date

 

                                        Immunization Adminstration,1 Vaccine/Tox

oid                      Injection      

                                                    Steve Etienne [facility] 2019







Immunizations





             CPT Code     Status       Date         Vaccine      Lot #

 

             U-Flu        Given        2021   Influenza,Unspecified  

 

             [id number]        Given        2019   Adacel- Tetanus Diphtheria P

ertussis [id number]

 

                                          

 

                [id number]           Refused         10/19/2018      Influenza Virus 

Vaccine, Quadrivalent (Cciiv4), Derived

From Cell                                

 

                67482           Refused         10/09/2017      Influenza Vaccin

e Quadrivalent Preser/Antibiotic Free 

Im Use                                   

 

                42033           Refused         10/09/2017      Influenza Vaccin

e Quadrivalent Preser/Antibiotic Free 

Im Use                                   







Vital Signs





                Date            Vital           Result          Comment

 

                2021 11:26am BP Systolic     110 mmHg        RT Arm

 

                    BP Diastolic        80 mmHg             RT Arm

 

                    Heart Rate          100 /min             

 

                    Height              64.25 inches        5'4.25"

 

                    Weight              256.25 lb            

 

                    BMI (Body Mass Index) 43.6 kg/m2           

 

                2021 11:29am BP Systolic     100 mmHg         

 

                    BP Diastolic        60 mmHg              

 

                    Height              64.25 inches        5'4.25"

 

                    Weight              254.00 lb            

 

                    BMI (Body Mass Index) 43.3 kg/m2           







Results





        Test    Acquired Date Facility Test    Result  H/L     Range   Note

 

                    Laboratory test finding 2021          [facility]

                                        [address] [place] [address] [phone] Erythrocyte Sedimentation Rate 49 mm/hr   High       0

-20        

 

             Phosphorus Level 2.1 mg/dL    Low          2.5-4.9       

 

             CPK Creatine Phosphokinase 43 U/L       Normal               

 

             Magnesium Level 2.3 mg/dL    Normal       1.8-2.4       

 

             Iron (Fe)    64 g/dL    Normal               

 

             Thyroid Stimulating Hormone 3.370 uIU/ML Normal       0.358-3.740  

 

 

             Vitamin B12 Level 432 pg/mL    Normal       247-911      1

 

             Total 25(Oh) Vitamin D 30.4 NG/ML   Normal       30.0-100.0    

 

             Rheumatoid Factor Quant < 10.0 IU/mL Normal       <15.0         

 

             C Reactive Protein Quantitativ 1.29 mg/dL   High         0.00-0.30 

    

 

             Cyclic Citrullinated Peptide 7 units      Normal       0-19        

 2

 

                    Krista Titer & Pattern 2021          [facility]

nter

                                        [address] [place] [address] [phone] Krista (Hep2) Negative   Normal     .          3







                          1                         VITAMIN B12 NORMAL RANGE



NORMAL                     247 - 911 PG/ML

INDETERMINATE              211 - 246 PG/ML

DEFICIENT              LESS THAN 211 PG/ML



 

                          2                         Negative               <20

Weak positive      20 - 39

Moderate positive  40 - 59

Strong positive        >59



 

                          3                         Negative   <1:80

Borderline  1:80

Positive   >1:80

ICAP nomenclature: AC-0

For more information about Hep-2 cell patterns use

ANAmainorerns.org, the official website for the

International Consensus on Antinuclear Antibody (KRISTA)

Patterns (ICAP).

Performed at:   - [facility]  [id number]

61

: Anna Cervantes MD, Phone:  [phone]

Performed at:   - [facility] [address]  [id number]

: Araceli B Reyes MD, Phone:  [phone]









Procedures





                Date            Code            Description     Status

 

                2021      [address]873           Office/Outpatient Established Mo

d MDM 30-39 Min Completed







Medical Devices





                                        Description

 

                                        No Information Available







Encounters





           Type       Date       Location   Provider   Dx         Diagnosis

 

                Office Visit    2021 11:20a [facility] OLI Farmer JR                        K58.0                     Irritable bowel syndrome wit

h diarrhea

 

                          E55.9                     Vitamin D deficiency, unspec

ified

 

                          E78.00                    Pure hypercholesterolemia, u

nspecified

 

                          F34.1                     Dysthymic disorder

 

                          M94.0                     Chondrocostal junction syndr

ome [Tietze]

 

                          E66.01                    Morbid (severe) obesity due 

to excess calories

 

                          Z68.41                    Body mass index [BMI] 40.0-4

4.9, adult

 

                          Z86.711                   Personal history of pulmonar

y embolism

 

                          Z79.01                    Long term (current) use of a

nticoagulants







Assessments





                Date            Code            Description     Provider

 

                2021      K58.0           Irritable bowel syndrome with di

arrhea OLI Sahni JR

 

                2021      E55.9           Vitamin D deficiency, unspecifie

d OLI Sahni JR

 

                2021      E78.00          Pure hypercholesterolemia, unspe

cified OLI Sahni JR

 

                2021      F34.1           Dysthymic disorder OLI Gaytan JR

 

                2021      M94.0           Chondrocostal junction syndrome 

[Tietze] OLI Sahni JR

 

                2021      E66.01          Morbid (severe) obesity due to e

xcess calories OLI Sahni JR

 

                2021      Z68.41          Body mass index [BMI]40.0-44.9, 

adult OLI Sahni JR

 

                2021      Z86.711         Personal history of pulmonary em

bolism OLI Sahni JR

 

                2021      Z79.01          Long term (current) use of antic

oagulants OLI Sahni JR







Plan of Treatment





No Information Available



Functional Status





                                        Description

 

                                        No Information Available







Mental Status





                                        Description

 

                                        No Information Available







Referrals





                                        Description

 

                                        No Information Available

## 2021-11-11 VITALS — SYSTOLIC BLOOD PRESSURE: 141 MMHG | DIASTOLIC BLOOD PRESSURE: 71 MMHG

## 2021-11-11 LAB
BASOPHILS # BLD AUTO: 0.1 10^3/UL (ref 0–0.2)
BASOPHILS NFR BLD AUTO: 0.4 % (ref 0–1)
CK MB CFR.DF SERPL CALC: 1.41
CK MB SERPL-MCNC: < 1 NG/ML (ref ?–3.6)
CK SERPL-CCNC: 71 U/L (ref 26–192)
EOSINOPHIL # BLD AUTO: 0.1 10^3/UL (ref 0–0.5)
EOSINOPHIL NFR BLD AUTO: 0.9 % (ref 0–3)
HCT VFR BLD AUTO: 39.9 % (ref 36–47)
HGB BLD-MCNC: 12.7 G/DL (ref 12–15.5)
LYMPHOCYTES # BLD AUTO: 3.1 10^3/UL (ref 1.5–5)
LYMPHOCYTES NFR BLD AUTO: 27.2 % (ref 24–44)
MCH RBC QN AUTO: 28.7 PG (ref 27–33)
MCHC RBC AUTO-ENTMCNC: 31.8 G/DL (ref 32–36.5)
MCV RBC AUTO: 90.3 FL (ref 80–96)
MONOCYTES # BLD AUTO: 0.7 10^3/UL (ref 0–0.8)
MONOCYTES NFR BLD AUTO: 6.2 % (ref 2–8)
NEUTROPHILS # BLD AUTO: 7.3 10^3/UL (ref 1.5–8.5)
NEUTROPHILS NFR BLD AUTO: 64.8 % (ref 36–66)
PLATELET # BLD AUTO: 399 10^3/UL (ref 150–450)
RBC # BLD AUTO: 4.42 10^6/UL (ref 4–5.4)
T4 FREE SERPL-MCNC: 1.05 NG/DL (ref 0.76–1.46)
TROPONIN I SERPL-MCNC: < 0.02 NG/ML (ref ?–0.1)
TSH SERPL DL<=0.005 MIU/L-ACNC: 5.22 UIU/ML (ref 0.36–3.74)
WBC # BLD AUTO: 11.3 10^3/UL (ref 4–10)

## 2021-11-11 NOTE — REPVR
PROCEDURE INFORMATION: 

Exam: XR Chest 

Exam date and time: 11/10/2021 12:13 AM 

Age: 41 years old 

Clinical indication: Shortness of breath; Additional info: SOB 



TECHNIQUE: 

Imaging protocol: XR of the chest. 

Views: 1 view. 



COMPARISON: 

CR PORTABLE CHEST X-RAY 9/28/2020 10:08 PM 



FINDINGS: 

Lungs: Left basilar atelectasis and/or infiltrate. Right lung is clear. 

Pleural spaces: Unremarkable. No pleural effusion. No pneumothorax. 

Heart/Mediastinum: Unremarkable. No cardiomegaly. 

Bones/joints: Unremarkable. 



IMPRESSION: 

Mild left basilar atelectasis and/or infiltrate. 



Electronically signed by: Zia Waite On 11/11/2021  00:57:56 AM

## 2021-11-11 NOTE — ECGEPIP
[facility] - ED

                                       

                                       Test Date:    2021-11-10

Pat Name:     FREDY SAMANO          Department:   

Patient ID:   [id number]                 Room:         -

Gender:       Female                   Technician:   MIRNA

:          1980               Requested By: NOREEN GRANT

Order Number: [id number]     Reading MD:   Narendra Higgins

                                 Measurements

Intervals                              Axis          

Rate:         108                      P:            48

HI:           150                      QRS:          1

QRSD:         80                       T:            48

QT:           324                                    

QTc:          434                                    

                           Interpretive Statements

Sinus tachycardia

Possible Inferior infarct , age undetermined

SIMILAR TO 20

Electronically Signed on 2021 11:04:55 EST by Narendra Higgins

## 2021-11-14 ENCOUNTER — HOSPITAL ENCOUNTER (EMERGENCY)
Dept: HOSPITAL 53 - M ED | Age: 41
LOS: 1 days | Discharge: HOME | End: 2021-11-15
Payer: COMMERCIAL

## 2021-11-14 VITALS — WEIGHT: 264.11 LBS | BODY MASS INDEX: 45.09 KG/M2 | HEIGHT: 64 IN

## 2021-11-14 VITALS — DIASTOLIC BLOOD PRESSURE: 80 MMHG | SYSTOLIC BLOOD PRESSURE: 130 MMHG

## 2021-11-14 VITALS — OXYGEN SATURATION: 98 %

## 2021-11-14 DIAGNOSIS — Z92.89: ICD-10-CM

## 2021-11-14 DIAGNOSIS — Z79.899: ICD-10-CM

## 2021-11-14 DIAGNOSIS — Z86.718: ICD-10-CM

## 2021-11-14 DIAGNOSIS — R06.02: Primary | ICD-10-CM

## 2021-11-14 DIAGNOSIS — J45.909: ICD-10-CM

## 2021-11-14 DIAGNOSIS — Z88.8: ICD-10-CM

## 2021-11-14 DIAGNOSIS — Z88.1: ICD-10-CM

## 2021-11-14 DIAGNOSIS — E03.9: ICD-10-CM

## 2021-11-14 LAB
APTT BLD: 32.5 SECONDS (ref 25.9–37)
BASOPHILS # BLD AUTO: 0 10^3/UL (ref 0–0.2)
BASOPHILS NFR BLD AUTO: 0.3 % (ref 0–1)
CK MB CFR.DF SERPL CALC: 1.72
CK MB SERPL-MCNC: < 1 NG/ML (ref ?–3.6)
CK SERPL-CCNC: 58 U/L (ref 26–192)
EOSINOPHIL # BLD AUTO: 0.1 10^3/UL (ref 0–0.5)
EOSINOPHIL NFR BLD AUTO: 1.1 % (ref 0–3)
HCT VFR BLD AUTO: 40.7 % (ref 36–47)
HGB BLD-MCNC: 13.1 G/DL (ref 12–15.5)
INR PPP: 1.09
LYMPHOCYTES # BLD AUTO: 2.4 10^3/UL (ref 1.5–5)
LYMPHOCYTES NFR BLD AUTO: 23 % (ref 24–44)
MCH RBC QN AUTO: 29 PG (ref 27–33)
MCHC RBC AUTO-ENTMCNC: 32.2 G/DL (ref 32–36.5)
MCV RBC AUTO: 90.2 FL (ref 80–96)
MONOCYTES # BLD AUTO: 0.6 10^3/UL (ref 0–0.8)
MONOCYTES NFR BLD AUTO: 5.7 % (ref 2–8)
NEUTROPHILS # BLD AUTO: 7.1 10^3/UL (ref 1.5–8.5)
NEUTROPHILS NFR BLD AUTO: 69.5 % (ref 36–66)
PLATELET # BLD AUTO: 408 10^3/UL (ref 150–450)
PROTHROMBIN TIME: 14.5 SECONDS (ref 12.7–14.5)
RBC # BLD AUTO: 4.51 10^6/UL (ref 4–5.4)
TROPONIN I SERPL-MCNC: < 0.02 NG/ML (ref ?–0.1)
WBC # BLD AUTO: 10.2 10^3/UL (ref 4–10)

## 2021-11-14 PROCEDURE — 71275 CT ANGIOGRAPHY CHEST: CPT

## 2021-11-14 PROCEDURE — 83880 ASSAY OF NATRIURETIC PEPTIDE: CPT

## 2021-11-14 PROCEDURE — 36415 COLL VENOUS BLD VENIPUNCTURE: CPT

## 2021-11-14 PROCEDURE — 84702 CHORIONIC GONADOTROPIN TEST: CPT

## 2021-11-14 PROCEDURE — 82553 CREATINE MB FRACTION: CPT

## 2021-11-14 PROCEDURE — 99284 EMERGENCY DEPT VISIT MOD MDM: CPT

## 2021-11-14 PROCEDURE — 93005 ELECTROCARDIOGRAM TRACING: CPT

## 2021-11-14 PROCEDURE — 85730 THROMBOPLASTIN TIME PARTIAL: CPT

## 2021-11-14 PROCEDURE — 94760 N-INVAS EAR/PLS OXIMETRY 1: CPT

## 2021-11-14 PROCEDURE — 85610 PROTHROMBIN TIME: CPT

## 2021-11-14 PROCEDURE — 82550 ASSAY OF CK (CPK): CPT

## 2021-11-14 PROCEDURE — 84484 ASSAY OF TROPONIN QUANT: CPT

## 2021-11-14 PROCEDURE — 93041 RHYTHM ECG TRACING: CPT

## 2021-11-14 PROCEDURE — 85025 COMPLETE CBC W/AUTO DIFF WBC: CPT

## 2021-11-14 PROCEDURE — 80047 BASIC METABLC PNL IONIZED CA: CPT

## 2021-11-14 NOTE — CCD
Summarization Of Episode

                             Created on: 2021



FREDY CAPUTO

External Reference #: [id number]

: 1980

Sex: Undifferentiated



Demographics





                          Address                   [address] [place] [address] 

[place]  [address]

 

                          Home Phone                [phone]

 

                          Preferred Language        English

 

                          Marital Status            Unknown

 

                          Yarsani Affiliation     Unknown

 

                          Race                      Unknown

 

                          Ethnic Group              Not  or 





Author





                          Author                    [facility]

 

                          Organization              [facility]

 

                          Address                   Unknown

 

                          Phone                     Unavailable







Support





                Name            Relationship    Address         Phone

 

                    INSTACART           Next Of Kin         UNKNOWN

[place]  [address]                    [phone]

 

                ST              Next Of Kin     Unknown         Unavailable

 

                    ADRY CAPUTO       Next Of Kin         [address] [place] [address] 

[place]  [address]                    [phone]

 

                    SELF EMPLOYED       Next Of Kin         [address] [place] [address] 

[place]  [address]                    [phone]

 

                UN              Next Of Kin     Unknown         Unavailable

 

                    ATUL  TED     Next Of Kin         [address] [place]  [address]                        [phone]

 

                UE              Next Of Kin     Unknown         Unavailable

 

                    JONATAN POLLARD  Next Of Kin         [address] [place]  [address]                        [phone]

 

                    KITTY CAPUTO     Next Of Kin         [address] [place] [address] 

[place]  [address]                    [phone]

 

                    KITTY CAPUTO      Next Of Kin         [address] [place] [address] 

[place]  [address]                    [phone]

 

                    Kitty Caputo      ECON                [address] [place]  [address]                    [phone]

 

                    ESTELA MCDANIEL       ECON                [address] [place]  [address]                       [phone]







Care Team Providers





                    Care Team Member Name Role                Phone

 

                    Maring,  Edgardo PA     Unavailable         Unavailable

 

                    Maring,  Edgardo PA     Unavailable         Unavailable

 

                    Maring,  Edgardo PA     Unavailable         Unavailable

 

                    Maring,  Edgardo PA     Unavailable         Unavailable

 

                    Maring,  Edgardo PA     Unavailable         Unavailable

 

                    Maring,  Edgardo PA     Unavailable         Unavailable

 

                    Maring,  Edgardo PA     Unavailable         Unavailable

 

                    Maring,  Edgardo PA     Unavailable         Unavailable

 

                    Maring,  Edgardo PA     Unavailable         Unavailable

 

                    Maring,  Edgardo PA     Unavailable         Unavailable

 

                    Maring,  Edgardo PA     Unavailable         Unavailable

 

                    Maring,  Edgardo PA     Unavailable         Unavailable

 

                    Maring,  Edgardo PA     Unavailable         Unavailable

 

                    Maring,  Edgardo PA     Unavailable         Unavailable

 

                    Maring,  Edgardo PA     Unavailable         Unavailable

 

                    Maring,  Edgardo PA     Unavailable         Unavailable

 

                    Abdullahi,  Izzy FNP Unavailable         Unavailable

 

                    Abdullahi,  Izzy FNP Unavailable         Unavailable

 

                    Abdullahi,  Izzy FNP Unavailable         Unavailable

 

                    Abdullahi,  Izzy FNP Unavailable         Unavailable

 

                    Abdullahi,  Izzy FNP Unavailable         Unavailable

 

                    Abdullahi,  Izzy FNP Unavailable         Unavailable

 

                    Abdullahi,  Izzy FNP Unavailable         Unavailable

 

                    Abdullahi,  Izzy FNP Unavailable         Unavailable

 

                    Abdullahi,  Izzy FNP Unavailable         Unavailable

 

                    Abdullahi,  Izzy FNP Unavailable         Unavailable

 

                    Abdullahi,  Izzy FNP Unavailable         Unavailable

 

                    Abdullahi,  Izzy FNP Unavailable         Unavailable

 

                    Abdullahi,  Izzy FNP Unavailable         Unavailable

 

                    Abdullahi,  Izzy FNP Unavailable         Unavailable

 

                    Abdullahi,  Izzy FNP Unavailable         Unavailable

 

                    Abdullahi,  Izzy FNP Unavailable         Unavailable

 

                    Abdullahi,  Izzy FNP Unavailable         Unavailable

 

                    Abdullahi,  Izzy FNP Unavailable         Unavailable

 

                    Abdullahi,  Izzy FNP Unavailable         Unavailable

 

                    Abdullahi,  Izzy FNP Unavailable         Unavailable

 

                    Abdullahi,  Izzy FNP Unavailable         Unavailable

 

                    Abdullahi,  Izzy FNP Unavailable         Unavailable

 

                    Abdullahi,  Izzy FNP Unavailable         Unavailable

 

                    Abdullahi,  Izzy FNP Unavailable         Unavailable

 

                    Abdullahi,  Izzy FNP Unavailable         Unavailable

 

                    Abdullahi,  Izzy FNP Unavailable         Unavailable

 

                    Abdullahi,  Izzy FNP Unavailable         Unavailable

 

                    Abdullahi,  Izzy FNP Unavailable         Unavailable

 

                    Abdullahi,  Izzy FNP Unavailable         Unavailable

 

                    Abdullahi,  Izzy FNP Unavailable         Unavailable

 

                    Abdullahi,  Izzy FNP Unavailable         Unavailable

 

                    Abdullahi,  Izzy FNP Unavailable         Unavailable

 

                    Abdullahi,  Izzy FNP Unavailable         Unavailable

 

                    Abdullahi,  Izzy FNP Unavailable         Unavailable

 

                    Abdullahi,  Izzy FNP Unavailable         Unavailable

 

                    MECHE LIMA MD Unavailable         Unavailable

 

                    MECHE LIMA MD Unavailable         Unavailable

 

                    MECHE LIMA MD Unavailable         Unavailable

 

                    CHROSTMECHE CLARK MD Unavailable         Unavailable

 

                    CHROSTMECHE CLARK MD Unavailable         Unavailable

 

                    CHROSTMECHE CLARK MD Unavailable         Unavailable

 

                    CHROSTMECHE CLARK MD Unavailable         Unavailable

 

                    CHRMECHE STUBBS MD Unavailable         Unavailable

 

                    REJIOSTMECHE CLARK MD Unavailable         Unavailable

 

                    CHROSTMECHE CLARK MD Unavailable         Unavailable

 

                    CHROSTMECHE CLARK MD Unavailable         Unavailable

 

                    CHROSTMECHE CLARK MD Unavailable         Unavailable

 

                    MECHE LIMA MD Unavailable         Unavailable

 

                    CHRMECHE STUBBS MD Unavailable         Unavailable

 

                    MECHE LIMA MD Unavailable         Unavailable

 

                    MECHE LIMA MD Unavailable         Unavailable

 

                    CHRMECHE STUBBS MD Unavailable         Unavailable

 

                    CHRMECHE STUBBS MD Unavailable         Unavailable

 

                    CHRMECHE TSUBBS MD Unavailable         Unavailable

 

                    CHROSTMECHE CLARK MD Unavailable         Unavailable

 

                    CHROSTMECHE CLARK MD Unavailable         Unavailable

 

                    CHROSTMECHE CLARK MD Unavailable         Unavailable

 

                    CHROSTMECHE CLARK MD Unavailable         Unavailable

 

                    CHROSTMECHE CLARK MD Unavailable         Unavailable

 

                    CHROSTMECHE CLARK MD Unavailable         Unavailable

 

                    CHROSTMECHE CLARK MD Unavailable         Unavailable

 

                    CHROSTMECHE CLARK MD Unavailable         Unavailable

 

                    CHROSTMECHE CLARK MD Unavailable         Unavailable

 

                    CHROSTMECHE CLARK MD Unavailable         Unavailable

 

                    CHROSTMECHE CLARK MD Unavailable         Unavailable

 

                    CHROSTMECHE CLARK MD Unavailable         Unavailable

 

                    CHROSTMECHE CLARK MD Unavailable         Unavailable

 

                    CHROSTMECHE CLARK MD Unavailable         Unavailable

 

                    CHROSTMECHE CLARK MD Unavailable         Unavailable

 

                    CHROSTMECHE CLARK MD Unavailable         Unavailable

 

                    CHROSTMECHE CLARK MD Unavailable         Unavailable

 

                    CHRMECHE STUBBS MD Unavailable         Unavailable

 

                    CHRMECHE STUBBS MD Unavailable         Unavailable

 

                    CHRMECHE STUBBS MD Unavailable         Unavailable

 

                    LAROCK, LOKI MATHEWS NP Unavailable         Unavailable

 

                    LAROCK, LOKI MATHEWS NP Unavailable         Unavailable

 

                    LAROCK, LOKI MATHEWS NP Unavailable         Unavailable

 

                    LAROCK, LOKI MATHEWS NP Unavailable         Unavailable

 

                    LAROCK, LOKI MATHEWS NP Unavailable         Unavailable

 

                    LAROCK, LOKI MATHEWS NP Unavailable         Unavailable

 

                    LAROCK, LOKI MATHEWS NP Unavailable         Unavailable

 

                    LAROCK, LOKI MATHEWS NP Unavailable         Unavailable

 

                    LAROCK, LOKI MATHEWS NP Unavailable         Unavailable

 

                    LAROCK, LOKI MATHEWS NP Unavailable         Unavailable

 

                    LAROCK, LOKI MATHEWS NP Unavailable         Unavailable

 

                    LAROCK, LOKI MATHEWS NP Unavailable         Unavailable

 

                    LAROCK, LOKI MATHEWS NP Unavailable         Unavailable

 

                    LAROCK, LOKI MATHEWS NP Unavailable         Unavailable

 

                    LAROCK, LOKI MATHEWS NP Unavailable         Unavailable

 

                    LAROCK, LOKI MATHEWS NP Unavailable         Unavailable

 

                    LAROCK, LOKI MATHEWS NP Unavailable         Unavailable

 

                    LAROCK, LOKI MATHEWS NP Unavailable         Unavailable

 

                    LAROCK, LOKI MATHEWS NP Unavailable         Unavailable

 

                    LAROCK, LOKI MATHEWS NP Unavailable         Unavailable

 

                    LAROCK, LOKI MATHEWS NP Unavailable         Unavailable

 

                    LAROCK, LOKI MATHEWS NP Unavailable         Unavailable

 

                    Feola, T Zaynab PA   Unavailable         Unavailable

 

                    Feola, T Zaynab PA   Unavailable         Unavailable

 

                    Feola, T Zaynab PA   Unavailable         Unavailable

 

                    Feola, T Zaynab PA   Unavailable         Unavailable

 

                    Feola, T Zaynab PA   Unavailable         Unavailable

 

                    Feola, T Zaynab PA   Unavailable         Unavailable

 

                    Feola, T Zaynab PA   Unavailable         Unavailable

 

                    Feola, T Zaynab PA   Unavailable         Unavailable

 

                    Feola, T Zaynab PA   Unavailable         Unavailable

 

                    Feola, T Zaynab PA   Unavailable         Unavailable

 

                    Feola, T Zaynab PA   Unavailable         Unavailable

 

                    Feola, T Zaynab PA   Unavailable         Unavailable

 

                    Feola, T Zaynab PA   Unavailable         Unavailable

 

                    Feola, T Zaynab PA   Unavailable         Unavailable

 

                    Feola, T Zaynab PA   Unavailable         Unavailable

 

                    Feola, T Zaynab PA   Unavailable         Unavailable

 

                    Feola, T Zaynab PA   Unavailable         Unavailable

 

                    Feola, T Zaynab PA   Unavailable         Unavailable

 

                    Feola, T Zaynab PA   Unavailable         Unavailable

 

                    Feola, T Zaynab PA   Unavailable         Unavailable

 

                    Feola, T Zaynab PA   Unavailable         Unavailable

 

                    Feola, T Zaynab PA   Unavailable         Unavailable

 

                    Feola, T Zaynab PA   Unavailable         Unavailable

 

                    Feola, T Zaynab PA   Unavailable         Unavailable

 

                    Feola, T Zaynab PA   Unavailable         Unavailable

 

                    Feola, T Zaynab PA   Unavailable         Unavailable

 

                    Feola, T Zaynab PA   Unavailable         Unavailable

 

                    Feola, T Zaynab PA   Unavailable         Unavailable

 

                    Feola, T Zaynab PA   Unavailable         Unavailable

 

                    Feola, T Zaynab PA   Unavailable         Unavailable

 

                    Feola, T Zaynab PA   Unavailable         Unavailable

 

                    Feola, T Zaynab PA   Unavailable         Unavailable

 

                    Feola, T Zaynab PA   Unavailable         Unavailable

 

                    Feola, T Zaynab PA   Unavailable         Unavailable

 

                    Feola, T Zaynab PA   Unavailable         Unavailable

 

                    Feola, T Zaynab PA   Unavailable         Unavailable

 

                    Feola, T Zaynab PA   Unavailable         Unavailable

 

                    Feola, T Zaynab PA   Unavailable         Unavailable

 

                    Feola, T Zaynab PA   Unavailable         Unavailable

 

                    Feola, T Zaynab PA   Unavailable         Unavailable

 

                    Feola, T Zaynab PA   Unavailable         Unavailable

 

                    Ali, Mohsin MD     Unavailable         Unavailable

 

                    Ali, Mohsin MD     Unavailable         Unavailable

 

                    Ali, Mohsin MD     Unavailable         Unavailable

 

                    Ali, Mohsin MD     Unavailable         Unavailable

 

                    Ali, Mohsin MD     Unavailable         Unavailable

 

                    Ali, Mohsin MD     Unavailable         Unavailable

 

                    Ali, Mohsin MD     Unavailable         Unavailable

 

                    Ali, Mohsin MD     Unavailable         Unavailable

 

                    Ali, Mohsin MD     Unavailable         Unavailable

 

                    Ali, Mohsin MD     Unavailable         Unavailable

 

                    Ali, Mohsin MD     Unavailable         Unavailable

 

                    Ali, Mohsin MD     Unavailable         Unavailable

 

                    Ali, Mohsin MD     Unavailable         Unavailable

 

                    Ali, Mohsin MD     Unavailable         Unavailable

 

                    Ali, Mohsin MD     Unavailable         Unavailable

 

                    Ali, Mohsin MD     Unavailable         Unavailable

 

                    Ali, Mohsin MD     Unavailable         Unavailable

 

                    Ali, Mohsin MD     Unavailable         Unavailable

 

                    Ali, Mohsin MD     Unavailable         Unavailable

 

                    Ali, Mohsin MD     Unavailable         Unavailable

 

                    Ali, Mohsin MD     Unavailable         Unavailable

 

                    Ali, Mohsin MD     Unavailable         Unavailable

 

                    Ali, Mohsin MD     Unavailable         Unavailable

 

                    Ali, Mohsin MD     Unavailable         Unavailable

 

                    Ali, Mohsin MD     Unavailable         Unavailable

 

                    Ali, Mohsin MD     Unavailable         Unavailable

 

                    Ali, Mohsin MD     Unavailable         Unavailable

 

                    Ali, Mohsin MD     Unavailable         Unavailable

 

                    Ali, Mohsin MD     Unavailable         Unavailable

 

                    Ali, Mohsin MD     Unavailable         Unavailable

 

                    Ali, Mohsin MD     Unavailable         Unavailable

 

                    Ali, Mohsin MD     Unavailable         Unavailable

 

                    Ali, Mohsin MD     Unavailable         Unavailable

 

                    Ali, Mohsin MD     Unavailable         Unavailable

 

                    Ali, Mohsin MD     Unavailable         Unavailable

 

                    Ali, Mohsin MD     Unavailable         Unavailable

 

                    Ali, Mohsin MD     Unavailable         Unavailable

 

                    Ali, Mohsin MD     Unavailable         Unavailable

 

                    Ali, Mohsin MD     Unavailable         Unavailable

 

                    Ali, Mohsin MD     Unavailable         Unavailable

 

                    Ali, Mohsin MD     Unavailable         Unavailable

 

                    Ali, Mohsin MD     Unavailable         Unavailable

 

                    Ali, Mohsin MD     Unavailable         Unavailable

 

                    Ali, Mohsin MD     Unavailable         Unavailable

 

                    Ali, Mohsin MD     Unavailable         Unavailable

 

                    Ali, Mohsin MD     Unavailable         Unavailable

 

                    Ali, Mohsin MD     Unavailable         Unavailable

 

                    Ali, Mohsin MD     Unavailable         Unavailable

 

                    Ali, Mohsin MD     Unavailable         Unavailable

 

                    Ali, Mohsin MD     Unavailable         Unavailable

 

                    Ali, Mohsin MD     Unavailable         Unavailable

 

                    PICKERAL JR, J GRACIA PA-C Unavailable         Unavailable

 

                    PICKERAL JR, J GRACIA PA-C Unavailable         Unavailable

 

                    PICKERAL JR, J GRACIA PA-C Unavailable         Unavailable

 

                    PICKERAL JR, J GRACIA PA-C Unavailable         Unavailable

 

                    PICKERAL JR, J GRACIA PA-C Unavailable         Unavailable

 

                    PICKERAL JR, J GRACIA PA-C Unavailable         Unavailable

 

                    PICKERAL JR, J GRACIA PA-C Unavailable         Unavailable

 

                    PICKERAL JR, J GRACIA PA-C Unavailable         Unavailable

 

                    PICKERAL JR, J GRACIA PA-C Unavailable         Unavailable

 

                    PICKERAL JR, J GRACIA PA-C Unavailable         Unavailable

 

                    PICKERAL JR, J GRACIA PA-C Unavailable         Unavailable

 

                    PICKERAL JR, J GRACIA PA-C Unavailable         Unavailable

 

                    PICKERAL JR, J GRACIA PA-C Unavailable         Unavailable

 

                    PICKERAL JR, J GRACIA PA-C Unavailable         Unavailable

 

                    PICKERAL JR, J GRACIA PA-C Unavailable         Unavailable

 

                    PICKERAL JR, J GRACIA PA-C Unavailable         Unavailable

 

                    PICKERAL JR, J GRACIA PA-C Unavailable         Unavailable

 

                    PICKERAL JR, J GRACIA PA-C Unavailable         Unavailable

 

                    PICKERAL JR, J GRACIA PA-C Unavailable         Unavailable

 

                    PICKERAL JR, J GRACIA PA-C Unavailable         Unavailable

 

                    PICKERAL JR, J GRACIA PA-C Unavailable         Unavailable

 

                    PICKERAL JR, J GRACIA PA-C Unavailable         Unavailable

 

                    PICKERAL JR, J GRACIA PA-C Unavailable         Unavailable

 

                    PICKERAL JR, J GRACIA PA-C Unavailable         Unavailable

 

                    PICKERAL JR, J GRACIA PA-C Unavailable         Unavailable

 

                    PICKERAL JR, J GRACIA PA-C Unavailable         Unavailable

 

                    PICKERAL JR, J GRACIA PA-C Unavailable         Unavailable

 

                    YoungKiara Nory PA-C Unavailable         Unavailable

 

                    YoungKiara Nory PA-C Unavailable         Unavailable

 

                    YoungKiara Nory PA-C Unavailable         Unavailable

 

                    YoungKiara Nory PA-C Unavailable         Unavailable

 

                    YoungKiara Nory PA-C Unavailable         Unavailable

 

                    YoungKiara Nory PA-C Unavailable         Unavailable

 

                    YoungKiara Nory PA-C Unavailable         Unavailable

 

                    Young, Kiara Nory PA-C Unavailable         Unavailable

 

                    Young, Kiara Nory PA-C Unavailable         Unavailable

 

                    Young, Kiara Nory PA-C Unavailable         Unavailable

 

                    Young, Kiara Nory PA-C Unavailable         Unavailable

 

                    Young, Kiara Nory PA-C Unavailable         Unavailable

 

                    Young, Kiara Nory PA-C Unavailable         Unavailable

 

                    Young, Kiara Nory PA-C Unavailable         Unavailable

 

                    Young, Kiara Nory PA-C Unavailable         Unavailable

 

                    Young, Kiara Nory PA-C Unavailable         Unavailable

 

                    Young, Kiara Nory PA-C Unavailable         Unavailable

 

                    Young, Kiara Nory PA-C Unavailable         Unavailable

 

                    Young, Kiara Nory PA-C Unavailable         Unavailable

 

                    Young, Kiara Nory PA-C Unavailable         Unavailable

 

                    Young, Kiara Nory PA-C Unavailable         Unavailable

 

                    Young, Kiara Nory PA-C Unavailable         Unavailable

 

                    Young, Kiara Nory PA-C Unavailable         Unavailable

 

                    Young, Kiara Nory PA-C Unavailable         Unavailable

 

                    Young, Kiara Nory PA-C Unavailable         Unavailable

 

                    DESJARLAIS,  NIGHAT NP Unavailable         Unavailable

 

                    DESJARLAIS,  NIGHAT NP Unavailable         Unavailable

 

                    DESJARLAIS,  NIGHAT NP Unavailable         Unavailable

 

                    DESJARLAIS,  NIGHAT NP Unavailable         Unavailable

 

                    DESJARLAIS,  NIGHAT NP Unavailable         Unavailable

 

                    DESJARLAIS,  NIGHAT NP Unavailable         Unavailable

 

                    DESJARLAIS,  NIGHAT NP Unavailable         Unavailable

 

                    DESJARLAIS,  NIGHAT NP Unavailable         Unavailable

 

                    DESJARLAIS,  NIGHAT NP Unavailable         Unavailable

 

                    DESJARLAIS,  NIGHAT NP Unavailable         Unavailable



                                  



Re-disclosure Warning

          The records that you are about to access may contain information from 
federally-assisted alcohol or drug abuse programs. If such information is 
present, then the following federally mandated warning applies: This information
has been disclosed to you from records protected by federal confidentiality 
rules (42 CFR part 2). The federal rules prohibit you from making any further 
disclosure of this information unless further disclosure is expressly permitted 
by the written consent of the person to whom it pertains or as otherwise 
permitted by 42 CFR part 2. A general authorization for the release of medical 
or other information is NOT sufficient for this purpose. The Federal rules 
restrict any use of the information to criminally investigate or prosecute any 
alcohol or drug abuse patient.The records that you are about to access may 
contain highly sensitive health information, the redisclosure of which is 
protected by Article 27-F of the [facility] Public Health law. If you 
continue you may have access to information: Regarding HIV / AIDS; Provided by 
facilities licensed or operated by the [facility] Office of Mental Health; 
or Provided by the [facility] Office for People With Developmental 
Disabilities. If such information is present, then the following [place] 
mandated warning applies: This information has been disclosed to you from 
confidential records which are protected by state law. State law prohibits you 
from making any further disclosure of this information without the specific 
written consent of the person to whom it pertains, or as otherwise permitted by 
law. Any unauthorized further disclosure in violation of state law may result in
a fine or prison sentence or both. A general authorization for the release of 
medical or other information is NOT sufficient authorization for further disc
losure.                                                                         
    



Family History

          



             Family Member Name Family Member Gender Family Member Status Date o

f Status 

Description                             Data Source(s)

 

           Unknown    Unknown    Problem                          [facility] ([facility])



                                                                                
       



Encounters

          



           Encounter  Providers  Location   Date       Indications Data Source(s

)

 

                Outpatient      Attender: Nory Young PA-C                 10/2

2021 12:40:35 PM EDT - 10/25/2021

01:50:18 PM EDT                                     [facility] ([facility]

)

 

                Outpatient      Attender: Edgardo BAIRD                 10/23/20

21 08:31:49 AM EDT - 10/23/2021 

08:58:46 AM EDT                                     [facility] ([facility]

)

 

           Outpatient Attender: NIGHAT KWON NP            10/20/2021 11:

00:00 AM [facility]

 

                Outpatient      Attender: Izzy Alva  11:20:00 AM 

EDT                                                 [facility] ([facility]

)

 

           Outpatient Attender: NIGHAT KWON NP            2021 01:

00:00 PM [facility]

 

           Outpatient Attender: NIGHAT KWON NP            2021 04:

40:00 PM [facility]

 

                Outpatient      Attender: Mohsin Ali MD Main office - [facility] 

2021 02:30:00 

PM EDT                                              [facility] ([facility]

 

                Outpatient      Attender: MECHE LIMA MD Main Office    

 07/15/2021 02:15:00 PM 

EDT                                                 [facility]

 

           Outpatient Attender: NIGHAT KWON NP            2021 02:

20:00 PM [facility]

 

           Outpatient Attender: NIGHAT KWON NP            2021 01:

20:00 PM [facility]

 

           Outpatient Attender: NIGHAT KWON NP            2021 04:

00:00 PM [facility]

 

                Outpatient      Attender: GRACIA PHILLIPS JR Preet Alva 0

2021 11:20:00 AM

EDT                                                 [facility] ([facility]

)

 

           Outpatient Attender: NIGHAT KWON NP            03/10/2021 08:

28:00 AM [facility]

 

                Outpatient      Attender: REID DOBBINS NP                  12:32:17 PM EST - 2021

01:26:51 PM EST                                     [facility] ([facility]

)

 

           Outpatient Attender: NIGHAT KWON NP            2021 04:

40:00 PM [facility]

 

                Outpatient      Attender: Zaynab BAIRD                 02/15/2

021 04:42:41 PM EST - 02/15/2021 

05:29:27 PM EST                                     [facility] ([facility]

)

 

                Outpatient      Attender: Zaynab BAIRD                 02/10/2

021 04:49:56 PM EST - 02/10/2021 

05:44:24 PM EST                                     [facility] ([facility]

)

 

           Outpatient Attender: NIGHAT KWON NP            2021 04:

40:00 PM [facility]

 

                Outpatient      Attender: MECHE LIMA MD Main Office    

 2021 01:15:00 PM 

EST                                                 [facility]

 

           Outpatient Attender: NIGHAT KWON NP            12/10/2020 04:

40:00 PM [facility]

 

           Outpatient Attender: NIGHAT KWON NP            10/23/2020 04:

40:00 PM [facility]

 

                Outpatient      Attender: Mohsin Ali MD Main office [facility] 

10/23/2020 09:30:00 

AM EDT                                              [facility] ([facility]

 

                Outpatient      Attender: MECHE LIMA MD Main Office    

 10/22/2020 10:15:00 AM 

EDT                                                 [facility] ([facility]



                                                                                
                                                                                
                                                                                
                                                                 



Immunizations

          



             Vaccine      Date         Status       Description  Data Source(s)

 

             COVID-19 VACCINE Pfizer 2021 12:00:00 AM EDT completed       

          [facility]

 

                                        Vaccine Series Complete: YESThis Data wa

s Submitted to [facility] Via [facility]. 

 

             COVID-19 VACCINE Pfizer 2021 12:00:00 AM EST completed       

          [facility]

 

                                        Vaccine Series Complete: NOThis Data was

 Submitted to [facility] Via [facility]. 

 

                                        This CVX code allows reporting of a vacc

ination when formulation is unknown (for

example, when recording a Influenza vaccination when noted on a vaccination 
card)           2021 09:45:00 AM EST completed                       [facility]

T ([facility])



                                                                                
                           



Medications

          



          Medication Brand Name Start Date Product Form Dose      Route     Admi

nistrative 

Instructions Pharmacy Instructions Status     Indications Reaction   Description

 Data 

Source(s)

 

                          14 ACTUAT fluticasone furoate 0.1 MG/ACTUAT Dry Powder

 Inhaler [Arnuity] Arnuity

 Ellipta 2020 12:00:00 AM EST             RESPIRATORY             active  

                 [facility] 

([facility])

 

                          120 ACTUAT mometasone furoate 0.1 MG/ACTUAT Metered Do

se Inhaler [Asmanex] 

Asmanex HFA 10/27/2020 12:00:00 AM EDT             RESPIRATORY             compl

eted                   

[facility] ([facility])

 

        zonisamide 50 MG Oral Capsule Zonisamide 10/23/2020 12:00:00 AM EDT     

            ORAL             

             active                                              [facility] ([facility]

 

          Flonase Allergy Relief Flonase Allergy Relief 10/22/2020 12:00:00 AM E

DT                                

                      completed                                   [facility] ([facility]

 

                          14 ACTUAT fluticasone furoate 0.1 MG/ACTUAT Dry Powder

 Inhaler [Arnuity] Arnuity

 Ellipta 10/22/2020 12:00:00 AM EDT             RESPIRATORY             complete

d                   [facility] 

([facility])



                                                                                
                            



Insurance Providers

          



             Payer name   Policy type / Coverage type Policy ID    Covered party

 ID Covered 

party's relationship to chung Policy Chung             Plan Information

 

          [facility] 010/510           [id number]           HU2          

       [id number]

 

                [place] BCBS   Health Maintenance Organization (HMO) [id number]

[id number]    

[id number]                                         

[id number]

 

          [employer] [place] 010/510           [id number]           2          

       [id number]

 

          BLUE CROSS           [id number]           SP                  [id number]

[id number]

 

                [place] Trad[employer] Medigap Part B  [id number]    

[id number] Family Dependent                        

[id number]

 

          [place] Tr[employer] Commercial           [id number]     Family Dependent  

          

 

                BS [place] Trad/MX Commercial      [id number]    

..[id number] Family Dependent                        

[id number]

 

          BC/BS Of [facility]           [id number]    Family Depende

nt            

 

          [employer] [place] 010/510           [id number]           2          

       [id number]

 

          [employer] [id number]           Spouse 

             [id number]

 

          [employer] Commercial Insurance Co. [id number]           Spouse 

             [id number]

 

          BCBS                ..[id number] Blue Cross/Bl

ue Shield           

..[id number]

 

          [place]              .[id number] Commercial Insur

ance Co.           

.[id number]

 

          [employer]           [id number]           SPO         

        [id number]

 

                [employer] [place] Commercial      [id number]       

.[id number] Family Dependent                        

[id number]

 

                [employer] [place] Commercial      [id number]       

[id number] Family Dependent                        

[id number]

 

          SELF PAY ONLY           0                   SP                  0

 

                [employer] [place] [employer]      [id number]       

.[id number] Family Dependent                        

[id number]

 

                [employer] [place] Commercial      [id number]       

[id number] Family Dependent                        

[id number]

 

                    O         UNAVAILABLE                               UNAVAILA

BLE

 

          BCBS [place] WAT[place] /307           [id number]           HU2      

           [id number]

 

          [place] BCBS B         [id number] P                   D

[id number]

 

          [employer]BS [place] WATN /307           [id number]           HU2      

           [id number]

 

          BCBS[employer] Commercial           [id number]     Family Dependent           

 

 

          BS [place] Commercial           [id number]    Family Dependent    

        

 

          BLUE CROSS BLUE SHIELD-O/P           [id number]           01        

          [id number]

 

          BCBS EMPIRE [place] DIV           [id number]           HU2           

      [id number]

 

                              [id number]                               [id number]

[id number]

 

          [facility]           [id number]           HU2                 89

[id number]

 

          [employer]           [id number]           SPO            

     [id number]

 

          [employer]           [id number]           SPO            

     [id number]

 

          BCBS E[employer]IRE [place] DIV           [id number]           HU2           

      [id number]

 

          [employer]           [id number]           SP                  89

[id number]

 

          [employer] O         [id number] S                   89

[id number]

 

          BCBS E[employer]IRE [place] DIV           [id number]           HU2            

     [id number]

 

          [employer] [place] 010/510           [id number]           HU2          

       [id number]

 

                [employer] [place] Commercial      [id number]       

[id number] Family Dependent                        

[id number]

 

                [facility] [place] Commercial      [id number]       

[id number] Family Dependent                        

[id number]



                                                                                
                                                                                
                                                                                
                                                                                
                                                                                
                                                          



Problems, Conditions, and Diagnoses

          



           Code       Display Name Description Problem Type Effective Dates Data

 Source(s)

 

             G47.00       Insomnia, unspecified INSOMNIA, UNSPECIFIED Diagnosis 

   2021 01:00:00

 PM [facility]

 

                    F43.23              Adjustment disorder with mixed anxiety a

nd depressed mood ADJUSTMENT 

DISORDER WITH MIXED ANXIETY AND DEPRESS Diagnosis           2021 04:40:00 

PM [facility]

 

                    F90.1               Attention-deficit hyperactivity disorder

, predominantly hyperactive type 

ATTN-DEFCT HYPERACTIVITY DISORDER, PREDOM HYPERACT Diagnosis                  04:40:00

 PM [facility]

 

             F41.1        Generalized anxiety disorder GENERALIZED ANXIETY DISOR

MEE Diagnosis    

2021 04:40:00 PM [facility]

 

                    F90.9               Attention-deficit hyperactivity disorder

, unspecified type ATTENTION-

DEFICIT HYPERACTIVITY DISORDER, UNSPECIFIED TYPE Diagnosis                  02:20:00 

PM [facility]



                                                                                
                                                          



Surgeries/Procedures

          



             Procedure    Description  Date         Indications  Data Source(s)

 

             OFFICE OUTPATIENT VISIT 25 MINUTES              2021 12:00:00

 AM EDT              [facility] 

([facility])

 

             OFFICE OUTPATIENT VISIT 25 MINUTES              2021 12:00:00

 AM EDT              [facility] ([facility] )

 

             BRNCDILAT RSPSE SPMTRY PRE&POST-BRNCDILAT ADMN              07/15/2

021 12:00:00 AM EDT              

[facility] ([facility]

 

             OFFICE OUTPATIENT VISIT 15 MINUTES              07/15/2021 12:00:00

 AM EDT              [facility] 

([facility]

 

             OFFICE OUTPATIENT VISIT 25 MINUTES              2021 12:00:00

 AM EDT              [facility] 

([facility])

 

             BRNCDILAT RSPSE SPMTRY PRE&POST-BRNCDILAT ADMN              

021 12:00:00 AM EST              

[facility] ([facility]

 

             PERCUTANEOUS TESTS W/ALLERGENIC EXTRACTS              2021 12

:00:00 AM EST              [facility] 

([facility])

 

             NITRIC OXIDE  GAS DETERMINATION              2021 12:0

0:00 AM EST              [facility] 

([facility])

 

             INTRACUTANEOUS TESTS W/ALLERGENIC EXTRACTS              2021 

12:00:00 AM EST              [facility]

 ([facility])



                                                                                
                                                                                
        



Results

          



                    ID                  Date                Data Source

 

                    [id number]          2021 01:36:00 AM EST [facility] ([facility])









          Name      Value     Range     Interpretation Code Description Data Adelaida

rce(s) Supporting 

Document(s)

 

           Gats Culture (Neg Strep SCR) Laboratory test result                  

                [facility] ([facility])                              

 

                                        FULL REPORT IN LAB NOTES ([facility] and [facility]

).

NEGATIVE FOR STREP PYOGENES (GROUP A)



 









                    ID                  Date                Data Source

 

                    [id number]          11/10/2021 11:43:00 PM EST [facility] ([facility])









          Name      Value     Range     Interpretation Code Description Data Adelaida

rce(s) Supporting 

Document(s)

 

                          Choriogonadotropin.beta subunit [Moles/volume] in Seru

m or Plasma Laboratory 

test result                                         [facility] ([facility]

)  

 

                                        <content>QUANTITATIVE RESULT          QU

ALITATIVE 

INTERPRETATION</content><br/><content>-------------------          
--------------------------</content><br/><content><5.0 IU/L                     
   NEGATIVE</content><br/><content>5.0 - 25.0 IU/L                  
INDETERMINATE</content><br/><content>>25.0 IU/L                       
POSITIVE</content><br/><content></content> 









                    ID                  Date                Data Source

 

                    [id number]          11/10/2021 11:40:00 PM EST [facility] ([facility])









          Name      Value     Range     Interpretation Code Description Data Adelaida

rce(s) Supporting 

Document(s)

 

           Laboratory test finding (navigational concept) 41.0 %     38.0-51.0  

                      [facility] 

([facility])                   

 

           Laboratory test finding (navigational concept) 140 meq/L  136-145    

                      [facility] 

[facility]                   

 

           Laboratory test finding (navigational concept) 83 mg/dL        

                      [facility] 

[facility]                   

 

           Laboratory test finding (navigational concept) 4.7 mg/dL  4.5-5.3    

                      [facility] 

[facility]                   

 

           Laboratory test finding (navigational concept) 102 meq/L       

                      [facility] 

[facility]                   

 

           Laboratory test finding (navigational concept) 3.4 meq/L  3.5-5.1    

                      [facility] 

([facility]                   

 

           Laboratory test finding (navigational concept) 6 mg/dL    8-26       

                      [facility] 

([facility])                   

 

           Laboratory test finding (navigational concept) 27.0 MM/L  23.0-27.0  

                      [facility] 

([facility])                   

 

           Laboratory test finding (navigational concept) 0.7 mg/dL  0.6-1.3    

                      [facility] 

([facility])                   









                    ID                  Date                Data Source

 

                    [id number]         10/23/2021 08:45:00 [facility]









          Name      Value     Range     Interpretation Code Description Data Adelaida

rce(s) Supporting 

Document(s)

 

          SARS-CoV-2 RNA Resp Ql TAMMY+probe NOT DETECTED                         

      [facility]     

 

                                        This lab was ordered by RAUL hendricks and reported by RAUL Beckham. 









                    ID                  Date                Data Source

 

                    [id number]          2021 08:45:00 AM EDT [facility] ([facility])









          Name      Value     Range     Interpretation Code Description Data Adelaida

rce(s) Supporting 

Document(s)

 

          Lisbeth (Hep2) Laboratory test result                               [facility]

 ([facility]  

 

                                        <content>Negative   <1:80</content><br/>

<content>Borderline  

1:80</content><br/><content>Positive   >1:80</content><br/><content>ICAP 
nomenclature: AC-0</content><br/><content>For more information about Hep-2 cell 
patterns use</content><br/><content>ANApatterns.org, the official website for 
the</content><br/><content>International Consensus on Antinuclear Antibody 
(LISBETH)</content><br/><content>Patterns (ICAP).</content><br/><content>Performed 
at:  [facility]</content><br/><content>[address] [place], [place]  [id number]</content><br/><content>: Anna Cervantes MD, Phone:  
[phone]</content><br/><content>Performed at:  [facility] 
[place]</content><br/><content>[address], [place]  
[id number]</content><br/><content>: Araceli B Reyes MD, Phone:  
[phone]</content><br/><content></content> 









                    ID                  Date                Data Source

 

                    [id number]          2021 08:45:00 AM EDT [facility]









          Name      Value     Range     Interpretation Code Description Data Adelaida

rce(s) Supporting 

Document(s)

 

           Phosphate [Moles/volume] in Serum or Plasma 2.1 mg/dL  2.5-4.9       

                   [facility] 

([facility]                   

 

           Erythrocyte sedimentation rate by Westergren method 49 mm/hr   0-20  

                           [facility] 

([facility])                   

 

           Magnesium [Moles/volume] in Serum or Plasma 2.3 mg/dL  1.8-2.4       

                   [facility] 

([facility])                   

 

             Creatine kinase [Enzymatic activity/volume] in Serum or Plasma 43 U

/L                            

                          [facility] ([facility])  

 

           Iron [Mass/volume] in Serum or Plasma 64 ug/dL                 

             [facility] ([facility])                              

 

           Calcidiol [Mass/volume] in Serum or Plasma 30.4 ng/mL 30.0-100.0     

                  [facility] 

([facility])                   

 

           Cobalamin (Vitamin B12) [Mass/volume] in Serum or Plasma 432 pg/mL  2

                          

[facility] ([facility])            

 

                                        VITAMIN B12 NORMAL RANGE



NORMAL                     247 - 911 PG/ML

INDETERMINATE              211 - 246 PG/ML

DEFICIENT              LESS THAN 211 PG/ML

 

 

                          Thyrotropin [Units/volume] in Serum or Plasma by Detec

tion limit <= 0.05 mIU/L 

3.370 uIU/ML 0.358-3.740                            [facility] ([facility]

)  

 

           Rheumatoid Factor Quant Laboratory test result                       

           [facility] ([facility])                              

 

                          C reactive protein [Mass/volume] in Serum or Plasma by

 High sensitivity method 

1.29 mg/dL   0.00-0.30                              [facility] ([facility]

)  

 

                    Cyclic citrullinated peptide IgG Ab [Units/volume] in Serum 

or Plasma 7 units             

0-19                                            [facility] ([facility])  

 

                                        <content>Negative               <20</con

tent><br/><content>Weak positive      20

 - 39</content><br/><content>Moderate positive  40 - 
59</content><br/><content>Strong positive        >59</content><br/>
<content></content> 









                    ID                  Date                Data Source

 

                    [id number]            2021 06:16:00 PM EST [employer] 

Diagnostics









          Name      Value     Range     Interpretation Code Description Data Adelaida

rce(s) Supporting 

Document(s)

 

           BHD COVID-19 RT-PCR NASAL SWAB Not Detected Not Detected             

          [employer]                              

 

                                        This test has received Emergency Use Aut

horization (EUA). We willcontinue to 

follow federal and state requirements for COVID-19reporting. This test was 
developed and its performance characteristicsdetermined by [employer]. It has not been cleared orapproved by the U.S. Food and Drug 
Administration but has been givenemergency use authorization. Results should be 
used in conjunctionwith clinical findings and should not form the sole basis for
 adiagnosis or treatment decision. Methods: SARS-CoV-2 Multiplex RT-PCRAssayA 
not detected (negative) test result for this test means that SARS-CoV-2 RNA was 
not present in the specimen above the limit ofdetection. Laboratory test results
 should always be considered in thecontext of clinical observations and 
epidemiological data in making afinal diagnosis and patient management 
decisions. Results will bereported to government agencies as required. 









                    ID                  Date                Data Source

 

                    [id number]            2021 01:00:00 PM EST [facility]









          Name      Value     Range     Interpretation Code Description Data Adelaida

rce(s) Supporting 

Document(s)

 

                                        SARS coronavirus 2 RNA [Presence] in Res

piratory specimen by TAMMY with probe 

detection  NEGATIVE                                    [facility]      

 

                                        This lab was ordered by [facility] and reported by [employer]. 









                    ID                  Date                Data Source

 

                    [id number]       2021 12:00:00 AM EST [facility]









          Name      Value     Range     Interpretation Code Description Data Adelaida

rce(s) Supporting 

Document(s)

 

          Carestart Rapid COVID Antigen Test Negative                           

     [facility]     

 

                                        [facility] 









                    ID                  Date                Data Source

 

                    [id number]            02/10/2021 12:00:00 AM EST [facility]









          Name      Value     Range     Interpretation Code Description Data Adelaida

rce(s) Supporting 

Document(s)

 

                                        SARS coronavirus 2 RNA [Presence] in Res

piratory specimen by TAMMY with probe 

detection  NEGATIVE                                    [facility]      

 

                                        This lab was ordered by [facility] and reported by [employer]. 









                    ID                  Date                Data Source

 

                    [id number]       02/10/2021 12:00:00 AM EST [facility]









          Name      Value     Range     Interpretation Code Description Data Adelaida

rce(s) Supporting 

Document(s)

 

          Carestart Rapid COVID Antigen Test Negative                           

     [facility]     

 

                                        This lab was reported by Iliana abdullahi. 









                    ID                  Date                Data Source

 

                    [id number]          2021 12:24:00 PM EST [facility] ([facility])









          Name      Value     Range     Interpretation Code Description Data Adelaida

rce(s) Supporting 

Document(s)

 

           Phosphate [Moles/volume] in Serum or Plasma 2.2 mg/dL  2.5-4.9       

                   [facility] 

([facility])                   

 

           Magnesium [Moles/volume] in Serum or Plasma 2.2 mg/dL  1.8-2.4       

                   [facility] 

([facility])                   

 

             Creatine kinase [Enzymatic activity/volume] in Serum or Plasma 63 U

/L                            

                          [facility] ([facility])  

 

           Cobalamin (Vitamin B12) [Mass/volume] in Serum or Plasma 410 pg/mL  2

                          

[facility] ([facility])            

 

                                        VITAMIN B12 NORMAL RANGE



NORMAL                     247 - 911 PG/ML

INDETERMINATE              211 - 246 PG/ML

DEFICIENT              LESS THAN 211 PG/ML

 

 

           Calcidiol [Mass/volume] in Serum or Plasma 47.0 ng/mL 30.0-100.0     

                  [facility] 

[facility]                   









                    ID                  Date                Data Source

 

                    [id number]          2020 08:50:00 PM EDT [facility] ([facility])









          Name      Value     Range     Interpretation Code Description Data Adelaida

rce(s) Supporting 

Document(s)

 

                          Natriuretic peptide.B prohormone N-Terminal [Mass/volu

me] in Serum or Plasma 10 

pg/mL                                               [facility] ([facility]

)  

 

           Thyroxine (T4) Ab [Units/volume] in Serum 8.9 ug/dL  4.5-12.0        

                 [facility] 

([facility])                   

 

                          Thyrotropin [Units/volume] in Serum or Plasma by Detec

tion limit <= 0.05 mIU/L 

2.670 uIU/ML 0.358-3.740                            [facility] ([facility]

)  

 

           HCG Serum Qualitative Laboratory test result                         

         [facility] ([facility])

                                         

 

           Bacteria identified in Blood by Culture Laboratory test result       

                           [facility] 

([facility])                   

 

                                        No growth after 72 hours . All specimens

 observed

for 5 days. Results final at that time.



No growth after 48 hours . All specimens observed

for 5 days. Results final at that time.



No growth after 24 hours . All specimens observed

for 5 days. Results final at that time.



NO GROWTH AFTER 5 DAYS

 









                    ID                  Date                Data Source

 

                    [id number]          2020 08:50:00 PM EDT [facility] ([facility])









          Name      Value     Range     Interpretation Code Description Data Adelaida

rce(s) Supporting 

Document(s)

 

          Glucose, Fasting 88 mg/dL                          [facility] ([facility])  

 

          Blood Urea Nitrogen 9 mg/dL   7-18                          [facility] ([facility])  

 

          Creatinine For GFR 0.90 mg/dL 0.55-1.30                     [facility] ([facility])  

 

           Glomerular Filtration Rate Laboratory test result                    

              [facility]                              

 

                                        <content>Units are mL/min/1.73 

m2</content><br/><content></content><br/><content>Chronic Kidney Disease Staging
 per NKF:</content><br/><content></content><br/><content>Stage I & II   GFR >=60
       Normal to Mildly Decreased</content><br/><content>Stage III      GFR 30-
59      Moderately Decreased</content><br/><content>Stage IV       GFR 15-29    
  Severely Decreased</content><br/><content>Stage V        GFR <15        Very 
Little GFR Left</content><br/><content>ESRD           GFR <15 on 
RRT</content><br/><content></content> 

 

          Sodium Level 137 meq/L 136-145                       [facility] ([facility])  

 

          Potassium Serum 4.0 meq/L 3.5-5.1                       [facility] ([facility])  

 

          Carbon Dioxide Level 24 meq/L  21-32                         [facility]  

 

          Anion Gap 6 meq/L   8-16                          [facility]  

 

          Chloride Level 107 meq/L                         [facility] ([facility])  

 

          Calcium Level 9.2 mg/dL 8.5-10.1                      [facility] ([facility])  









                    ID                  Date                Data Source

 

                    [id number]          2020 08:50:00 PM EDT [facility] ([facility])









          Name      Value     Range     Interpretation Code Description Data Adelaida

rce(s) Supporting 

Document(s)

 

          Alt/SGPT  23 U/L    12-78                         [facility]  

 

          Ast/Sgot  11 U/L    7-37                          [facility]  

 

           Bilirubin,Direct Laboratory test result 0.0-0.2                      

    [facility]                              

 

          Alkaline Phosphatase 152 U/L                           [facility] ([facility])  

 

          Bilirubin,Total 0.2 mg/dL 0.2-1.0                       [facility] ([facility])  

 

          Total Protein 7.5 GM/DL 6.4-8.2                       [facility] ([facility])  

 

          Albumin   3.2 GM/DL 3.2-5.2                       [facility]  

 

          Albumin/Globulin Ratio 0.7       1.2-2.2                       [facility] 

([facility])  









                    ID                  Date                Data Source

 

                    [id number]          2020 08:50:00 PM EDT [facility] ([facility])









          Name      Value     Range     Interpretation Code Description Data Adelaida

rce(s) Supporting 

Document(s)

 

          CPK Creatine Phosphokinase 63 U/L                            [facility]

[facility] ([facility])  

 

          MB/CK Relative Index 1.59                                    [facility] ([facility])  

 

                                        <content>DIAGNOSIS CRITERIA</content><br

/><content>MMB ng/ml       Relative 

Index (RI)</content><br/><content>NON-AMI               < or = 5               
N/A</content><br/><content>GRAY ZONE              > 5                < or = 
4</content><br/><content>AMI                    > 5                   > 
4</content><br/><content></content> 

 

          Troponin I Laboratory test result                               [facility]

 ([facility])  

 

                                        <content>Troponin I Reference Interval f

or Siemens [place] 

LOCI:</content><br/><content></content><br/><content>99th Percentile= 0.00-0.045
 ng/ml</content><br/><content></content><br/><content>Risk 
Stratification:</content><br/><content><= 0.10 ng/ml   Decreased Risk for 
Adverse Clinical</content><br/><content>Events.</content><br/><content>0.10-1.50
 ng/ml   Increased Risk for Adverse Clinical</content><br/><content>Events. 
Evaluation of additional</content><br/><content>criterion and/or repeat testing 
in 2-6</content><br/><content>hours is suggested to rule out 
myocardial</content><br/><content>damage.</content><br/><content>>= 1.50 ng/ml  
 Indicative of Myocardial Injury.</content><br/><content></content> 

 

          CK-MB Value Mass Laboratory test result                               

[facility] ([facility])  









                    ID                  Date                Data Source

 

                    [id number]          2020 08:50:00 [facility]









          Name      Value     Range     Interpretation Code Description Data Adelaida

rce(s) Supporting 

Document(s)

 

          Inr       1.13                                    [facility]  

 

                                        THERAPUTIC HUMAN INR VALUES

INDICATIONS                      NORMAL RANGES

PROPHYLAXIS/TREATMENT OF:

VENOUS THROMBOSIS                2.0-3.0

PULMONARY EMBOLISM               2.0-3.0

PREVENTION OF SYSTEMIC EMBOLISM FROM:

TISSUE HEART VALVES              2.0-3.0

ACUTE MYOCARDIAL INFARCTION      2.0-3.0

VALVULAR HEART DISEASE           2.0-3.0

ATRIAL FIBRILLATION              2.0-3.0

MECHANICAL VALVES(HIGH RISK)     2.5-3.5

RECURRENT MYOCARDIAL INFARCTION  2.5-3.5

 

 

          Prothrombin Time 14.7 s    12.5-14.3                     [facility]  









                    ID                  Date                Data Source

 

                    [id number]          2020 08:50:00 PM EDT [facility] ([facility])









          Name      Value     Range     Interpretation Code Description Data Adelaida

rce(s) Supporting 

Document(s)

 

          White Blood Count 8.9 10    4.0-10.0                      [facility] ([facility])  

 

          Hemoglobin 13.1 g/dL 12.0-15.5                     [facility] ([facility]

[facility]  

 

          Hematocrit 39.6 %    36.0-47.0                     [facility]  

 

          Red Blood Count 4.54 10   4.00-5.40                     [facility] ([facility])  

 

          Mean Corpuscular Hemoglobin 28.9 pg   27.0-33.0                     [facility]

[facility] ([facility]) 

 

 

          Mean Corpuscular HGB Conc 33.1 g/dL 32.0-36.5                     [facility]

[facility] ([facility]) 

 

 

          Mean Corpuscular Volume 87.2 fl   80.0-96.0                     [facility]

 ([facility])  

 

          Red Cell Distribution Width 13.2 %    11.5-14.5                     [facility]

[facility] ([facility])  

 

          Platelet Count, Automated 411 10    150-450                       [facility]

[facility] ([facility])  

 

          Neutrophils % 66.4 %    36.0-66.0                     [facility] ([facility])  

 

          Mono %    5.8 %     0.0-5.0                       [facility]  

 

          Lymph %   26.2 %    24.0-44.0                     [facility]  

 

          Eos %     0.9 %     0.0-3.0                       [facility]  

 

          Baso %    0.4 %     0.0-1.0                       [facility]  

 

          Immature Granulocyte % 0.3 %     0-3.0                         [facility] 

([facility])  

 

          Neutrophils # 5.9 10    1.5-8.5                       [facility] ([facility])  

 

          Nucleated Red Blood Cell % 0.0 %     0-0                           [facility]

[facility] ([facility])  

 

          Mono #    0.5 10    0.0-0.8                       [facility]  

 

          Lymph #   2.3 10    1.5-5.0                       [facility]  

 

          Eos #     0.1 10    0.0-0.5                       [facility]  

 

          Baso #    0.0 10    0.0-0.2                       [facility]  









                    ID                  Date                Data Source

 

                    [id number]          2020 08:45:00 PM EDT [facility] ([facility])









          Name      Value     Range     Interpretation Code Description Data Adelaida

rce(s) Supporting 

Document(s)

 

          Blood Culture Laboratory test result                               [facility]

[facility] ([facility])  

 

                                        No growth after 72 hours . All specimens

 observed

for 5 days. Results final at that time.



No growth after 48 hours . All specimens observed

for 5 days. Results final at that time.



No growth after 24 hours . All specimens observed

for 5 days. Results final at that time.



NO GROWTH AFTER 5 DAYS

 







                                        Procedure

 

                                          



                                                                                
                                                                                
                                                                                
                                      



Social History

          



           Code       Duration   Value      Status     Description Data Source(s

)

 

             Smoking      07/15/2021 12:00:00 AM EDT Patient has never smoked co

mpleted    Patient 

has never smoked                        [facility] ([facility]

)



                                                                                
                  



Vital Signs

          



                    ID                  Date                Data Source

 

                    UNK                                      









           Name       Value      Range      Interpretation Code Description Data

 Source(s)

 

           Systolic blood pressure 110 mm[Hg]                       110 mm[Hg] M

[facility] ([facility])

 

                                        RT Arm 

 

           Diastolic blood pressure 80 mm[Hg]                        80 mm[Hg]  

[facility] ([facility])

 

                                        RT Arm 

 

           Heart rate 100 /min                         100 /min   [facility] ([facility])

 

           Body weight 256.25 [lb_av]                       256.25 [lb_av] [facility]

T ([facility])

 

           Body mass index (BMI) [Ratio] 43.6 kg/m2                       43.6 k

g/m2 [facility] ([facility])

 

           Body height 64.25 [in_i]                       64.25 [in_i] [facility]

 

                                        5'4.25" 

 

           Diastolic blood pressure 76 mm[Hg]                        76 mm[Hg]  

[facility]

 

           Body mass index (BMI) [Ratio] 43.1 kg/m2                       43.1 k

g/m2 [facility] ([facility]

 

           Body weight 251.12 [lb_av]                       251.12 [lb_av] [facility]

[facility] ([facility]

 

           Body height 64 [in_i]                        64 [in_i]  [facility] ([facility]

 

                                        5'4" 

 

           Heart rate 128 /min                         128 /min   [facility]

 

           Respiratory rate 20 /min                          20 /min    [facility] (

[facility])

 

           Systolic blood pressure 108 mm[Hg]                       108 mm[Hg] M

[facility] ([facility]

 

           Body weight 254.00 [lb_av]                       254.00 [lb_av] [facility]

T ([facility])

 

           Systolic blood pressure 100 mm[Hg]                       100 mm[Hg] 

[facility] ([facility])

 

           Body mass index (BMI) [Ratio] 43.3 kg/m2                       43.3 k

g/m2 [facility]

 

           Body height 64.25 [in_i]                       64.25 [in_i] [facility] ([facility])

 

                                        5'4.25" 

 

           Diastolic blood pressure 60 mm[Hg]                        60 mm[Hg]  

[facility] ([facility])

 

           Heart rate 114 /min                         114 /min   [facility]

 

           Body weight 247.12 [lb_av]                       247.12 [lb_av] [facility]

T ([facility]

 

           Body height 64 [in_i]                        64 [in_i]  [facility] ([facility]

 

                                        5'4" 

 

           Respiratory rate 18 /min                          18 /min    [facility] (

[facility])

 

           Systolic blood pressure 97 mm[Hg]                        97 mm[Hg]  

[facility] ([facility]

 

           Diastolic blood pressure 67 mm[Hg]                        67 mm[Hg]  

[facility] ([facility]

 

           Body mass index (BMI) [Ratio] 42.4 kg/m2                       42.4 k

g/m2 [facility] ([facility])

 

           Heart rate 99 /min                          99 /min    [facility] ([facility]

 

           Respiratory rate 16 /min                          16 /min    [facility] (

[facility])

 

           Systolic blood pressure 110 mm[Hg]                       110 mm[Hg] 

[facility] ([facility]

 

           Body weight 249.00 [lb_av]                       249.00 [lb_av] [facility]

T ([facility]

 

           Body height 64 [in_i]                        64 [in_i]  [facility] ([facility])

 

                                        5'4" 

 

           Diastolic blood pressure 75 mm[Hg]                        75 mm[Hg]  

[facility] ([facility]

 

           Body mass index (BMI) [Ratio] 42.7 kg/m2                       42.7 k

g/m2 [facility] ([facility]

## 2021-11-14 NOTE — REPVR
PROCEDURE INFORMATION: 

Exam: CTA Chest With Contrast 

Exam date and time: 11/14/2021 10:50 PM 

Age: 41 years old 

Clinical indication: Pain; Chest pressure; Additional info: SOB, pleuritic cp, 

prior pe 



TECHNIQUE: 

Imaging protocol: Computed tomographic angiography of the chest with contrast. 

3D rendering (Not supervised by radiologist): MIP and/or 3D reconstructed 

images were created by the technologist. 

Radiation optimization: All CT scans at this facility use at least one of these 

dose optimization techniques: automated exposure control; mA and/or kV 

adjustment per patient size (includes targeted exams where dose is matched to 

clinical indication); or iterative reconstruction. 

Contrast material: ; Contrast volume: 75 ml; Contrast route: INTRAVENOUS 

(IV);  



COMPARISON: 

CT ANGIO CHEST 11/29/2018 6:36 PM 

___________________________________________________

FINDINGS:

-

PULMONARY ARTERIES:

Diameter of the main pulmonary trunk at 21.5 mm is within normal range.  

Enhancement within the pulmonary arteries is preserved bilaterally through the 

distal segmental levels, without evidence of acute appearing occlusive 

pulmonary emboli.  Evaluation beyond the distal segmental levels is compromised 

secondary to suboptimal peripheral contrast bolus and motion related 

misregistration.

-

HEART AND AORTA:

Cardiac size is at the upper end of normal.  

Trace amount of pericardial fluid.  

Evaluation of the aortic root and ascending thoracic aorta is compromised by 

cardiac motion artifact.  No thoracic aortic aneurysm or dissection is seen 

otherwise.  

Visualized proximal great vessels within the superior mediastinum are patent.

-

MEDIASTINUM:

No mediastinal soft tissue gas.  

The visualized thyroid gland is within normal limits.  

No mediastinal hematoma.  

Tiny gas containing hiatal hernia.  No periesophageal inflammatory change.  

No mediastinal or hilar lymphadenopathy by size criteria.

-

LUNGS:

The right diaphragm is elevated compared to the left.  This may be secondary to 

underlying hepatic enlargement.  

Trace amounts of pleural fluid are seen.  

No pneumothorax.  

There is no consolidation.  

A few subpleural reticular opacities are noted which may be secondary to 

atelectasis and/or parenchymal scarring.  

No suspicious pulmonary parenchymal mass.  

No bronchiectasis or peribronchial thickening.

-

UPPER ABDOMEN:

No free air or free fluid within the visualized upper most abdomen.  

The entire liver is not included.  Visualized hepatic length is 19.5 cm.  

Hepatic attenuation is consistent with steatosis.  

Surgical clips are seen in the gallbladder fossa.  

-

MSK AND BODY WALL:

Mild degenerative changes of the spine and bony thorax.  

No acute fracture or suspicious bone lesions seen.  

Mild curvature of the spine may be positional or related to mild scoliosis.

________________________________________________

-

IMPRESSION:

No evidence of acute pulmonary embolus through the distal segmental levels.  

-

Trace amounts of pleural fluid bilaterally.  

-

Other incidental findings discussed above.



Electronically signed by: Ivan Rebolledo On 11/14/2021  23:40:09 PM

## 2021-11-14 NOTE — CCD
Summarization Of Episode

                             Created on: 2021



FREDY CAPUTO

External Reference #: [id number]

: 1980

Sex: Undifferentiated



Demographics





                          Address                   [address] [place] [address] 

[place]  [address]

 

                          Home Phone                [phone]

 

                          Preferred Language        English

 

                          Marital Status            Unknown

 

                          Islam Affiliation     Unknown

 

                          Race                      Unknown

 

                          Ethnic Group              Not  or 





Author





                          Author                    [facility]

 

                          Organization              [facility]

 

                          Address                   Unknown

 

                          Phone                     Unavailable







Support





                Name            Relationship    Address         Phone

 

                    INSTACART           Next Of Kin         UNKNOWN

[place]  [address]                    [phone]

 

                ST              Next Of Kin     Unknown         Unavailable

 

                    ADRY CAPUTO       Next Of Kin         [address] [place] [address] 

[facility] [place]  [address]                    [phone]

 

                    SELF EMPLOYED       Next Of Kin         [address] [place] [address] 

[place]  [address]                    [phone]

 

                UN              Next Of Kin     Unknown         Unavailable

 

                    ATUL  TED     Next Of Kin         [address] [place]  [address]                        [phone]

 

                UE              Next Of Kin     Unknown         Unavailable

 

                    JONATAN POLLARD  Next Of Kin         [address] [place]  [address]                        [phone]

 

                    KITTY CAPUTO     Next Of Kin         [address] [place] [address] 

[place]  [address]                    [phone]

 

                    KITTY CAPUTO      Next Of Kin         [address] [place] [address] 

[place]  [address]                    [phone]

 

                    Kitty Caputo      ECON                [address] [place]  [address]                    [phone]

 

                    ESTELA MCDANIEL       ECON                [address] [place]  [address]                       [phone]







Care Team Providers





                    Care Team Member Name Role                Phone

 

                    Maring,  Edgardo PA     Unavailable         Unavailable

 

                    Maring,  Edgardo PA     Unavailable         Unavailable

 

                    Maring,  Edgardo PA     Unavailable         Unavailable

 

                    Maring,  Edgardo PA     Unavailable         Unavailable

 

                    Maring,  Edgardo PA     Unavailable         Unavailable

 

                    Maring,  Edgardo PA     Unavailable         Unavailable

 

                    Maring,  Edgardo PA     Unavailable         Unavailable

 

                    Maring,  Edgardo PA     Unavailable         Unavailable

 

                    Maring,  Edgardo PA     Unavailable         Unavailable

 

                    Maring,  Edgardo PA     Unavailable         Unavailable

 

                    Maring,  Edgardo PA     Unavailable         Unavailable

 

                    Maring,  Edgardo PA     Unavailable         Unavailable

 

                    Maring,  Edgardo PA     Unavailable         Unavailable

 

                    Maring,  Edgardo PA     Unavailable         Unavailable

 

                    Maring,  Edgardo PA     Unavailable         Unavailable

 

                    Maring,  Edgardo PA     Unavailable         Unavailable

 

                    Abdullahi,  Izzy FNP Unavailable         Unavailable

 

                    Abdullahi,  Izzy FNP Unavailable         Unavailable

 

                    Abdullahi,  Izzy FNP Unavailable         Unavailable

 

                    Abdullahi,  Izzy FNP Unavailable         Unavailable

 

                    Abdullahi,  Izzy FNP Unavailable         Unavailable

 

                    Abdullahi,  Izzy FNP Unavailable         Unavailable

 

                    Abdullahi,  Izzy FNP Unavailable         Unavailable

 

                    Abdullahi,  Izzy FNP Unavailable         Unavailable

 

                    Abdullahi,  Izzy FNP Unavailable         Unavailable

 

                    Abdullahi,  Izzy FNP Unavailable         Unavailable

 

                    Abdullahi,  Izzy FNP Unavailable         Unavailable

 

                    Abdullahi,  Izzy FNP Unavailable         Unavailable

 

                    Abdullahi,  Izzy FNP Unavailable         Unavailable

 

                    Abdullahi,  Izzy FNP Unavailable         Unavailable

 

                    Abdullahi,  Izzy FNP Unavailable         Unavailable

 

                    Abdullahi,  Izzy FNP Unavailable         Unavailable

 

                    Abdullahi,  Izzy FNP Unavailable         Unavailable

 

                    Abdullahi,  Izzy FNP Unavailable         Unavailable

 

                    Abdullahi,  Izzy FNP Unavailable         Unavailable

 

                    Abdullahi,  Izzy FNP Unavailable         Unavailable

 

                    Abdullahi,  Izzy FNP Unavailable         Unavailable

 

                    Abdullahi,  Izzy FNP Unavailable         Unavailable

 

                    Abdullahi,  Izzy FNP Unavailable         Unavailable

 

                    Abdullahi,  Izzy FNP Unavailable         Unavailable

 

                    Abdullahi,  Izzy FNP Unavailable         Unavailable

 

                    Abdullahi,  Izzy FNP Unavailable         Unavailable

 

                    Abdullahi,  Izzy FNP Unavailable         Unavailable

 

                    Abdullahi,  Izzy FNP Unavailable         Unavailable

 

                    Abdullahi,  Izzy FNP Unavailable         Unavailable

 

                    Abdullahi,  Izzy FNP Unavailable         Unavailable

 

                    Abdullahi,  Izzy FNP Unavailable         Unavailable

 

                    Abdullahi,  Izzy FNP Unavailable         Unavailable

 

                    Abdullahi,  Izzy FNP Unavailable         Unavailable

 

                    Abdullahi,  Izzy FNP Unavailable         Unavailable

 

                    Abdullahi,  Izzy FNP Unavailable         Unavailable

 

                    MECHE LIMA MD Unavailable         Unavailable

 

                    MECHE LIMA MD Unavailable         Unavailable

 

                    MECHE LIMA MD Unavailable         Unavailable

 

                    CHROSTMECHE CLARK MD Unavailable         Unavailable

 

                    CHROSTMECHE CLARK MD Unavailable         Unavailable

 

                    CHROSTMECHE CLARK MD Unavailable         Unavailable

 

                    CHROSTMECHE CLARK MD Unavailable         Unavailable

 

                    CHRMECHE STUBBS MD Unavailable         Unavailable

 

                    REJIOSTMECHE CLARK MD Unavailable         Unavailable

 

                    CHROSTMECHE CLARK MD Unavailable         Unavailable

 

                    CHROSTMECHE CLARK MD Unavailable         Unavailable

 

                    CHROSTMECHE CLARK MD Unavailable         Unavailable

 

                    MECHE LIMA MD Unavailable         Unavailable

 

                    CHRMECHE STUBBS MD Unavailable         Unavailable

 

                    MECHE LIMA MD Unavailable         Unavailable

 

                    MECHE LIMA MD Unavailable         Unavailable

 

                    CHRMECHE STUBBS MD Unavailable         Unavailable

 

                    CHRMECHE STUBBS MD Unavailable         Unavailable

 

                    CHRMECHE STUBBS MD Unavailable         Unavailable

 

                    CHROSTMECHE CLARK MD Unavailable         Unavailable

 

                    CHROSTMECHE CLARK MD Unavailable         Unavailable

 

                    CHROSTMECHE CLARK MD Unavailable         Unavailable

 

                    CHROSTMECHE CLARK MD Unavailable         Unavailable

 

                    CHROSTMECHE CLARK MD Unavailable         Unavailable

 

                    CHROSTMECHE CLARK MD Unavailable         Unavailable

 

                    CHROSTMECHE CLARK MD Unavailable         Unavailable

 

                    CHROSTMECHE CLARK MD Unavailable         Unavailable

 

                    CHROSTMECHE CLARK MD Unavailable         Unavailable

 

                    CHROSTMECHE CLARK MD Unavailable         Unavailable

 

                    CHROSTMECHE CLARK MD Unavailable         Unavailable

 

                    CHROSTMECHE CLARK MD Unavailable         Unavailable

 

                    CHROSTMECHE CLARK MD Unavailable         Unavailable

 

                    CHROSTMECHE CLARK MD Unavailable         Unavailable

 

                    CHROSTMECHE CLARK MD Unavailable         Unavailable

 

                    CHROSTMECHE CLARK MD Unavailable         Unavailable

 

                    CHROSTMECHE CLARK MD Unavailable         Unavailable

 

                    CHRMECHE STUBBS MD Unavailable         Unavailable

 

                    CHRMECHE STUBBS MD Unavailable         Unavailable

 

                    CHRMECHE STUBBS MD Unavailable         Unavailable

 

                    LAROCK, LOKI MATHEWS NP Unavailable         Unavailable

 

                    LAROCK, LOKI MATHEWS NP Unavailable         Unavailable

 

                    LAROCK, LOKI MATHEWS NP Unavailable         Unavailable

 

                    LAROCK, LOKI MATHEWS NP Unavailable         Unavailable

 

                    LAROCK, LOKI MATHEWS NP Unavailable         Unavailable

 

                    LAROCK, LOKI MATHEWS NP Unavailable         Unavailable

 

                    LAROCK, LOKI MATHEWS NP Unavailable         Unavailable

 

                    LAROCK, LOKI MATHEWS NP Unavailable         Unavailable

 

                    LAROCK, LOKI MATHEWS NP Unavailable         Unavailable

 

                    LAROCK, LOKI MATHEWS NP Unavailable         Unavailable

 

                    LAROCK, LOKI MATHEWS NP Unavailable         Unavailable

 

                    LAROCK, LOKI MATHEWS NP Unavailable         Unavailable

 

                    LAROCK, LOKI MATHEWS NP Unavailable         Unavailable

 

                    LAROCK, LOKI MATHEWS NP Unavailable         Unavailable

 

                    LAROCK, LOKI MATHEWS NP Unavailable         Unavailable

 

                    LAROCK, LOKI MATHEWS NP Unavailable         Unavailable

 

                    LAROCK, LOKI MATHEWS NP Unavailable         Unavailable

 

                    LAROCK, LOKI MATHEWS NP Unavailable         Unavailable

 

                    LAROCK, LOKI MATHEWS NP Unavailable         Unavailable

 

                    LAROCK, LOKI MATHEWS NP Unavailable         Unavailable

 

                    LAROCK, LOKI MATHEWS NP Unavailable         Unavailable

 

                    LAROCK, LOKI MATHEWS NP Unavailable         Unavailable

 

                    Feola, T Zaynab PA   Unavailable         Unavailable

 

                    Feola, T Zaynab PA   Unavailable         Unavailable

 

                    Feola, T Zaynab PA   Unavailable         Unavailable

 

                    Feola, T Zaynab PA   Unavailable         Unavailable

 

                    Feola, T Zaynab PA   Unavailable         Unavailable

 

                    Feola, T Zaynab PA   Unavailable         Unavailable

 

                    Feola, T Zaynab PA   Unavailable         Unavailable

 

                    Feola, T Zaynab PA   Unavailable         Unavailable

 

                    Feola, T Zaynab PA   Unavailable         Unavailable

 

                    Feola, T Zaynab PA   Unavailable         Unavailable

 

                    Feola, T Zaynab PA   Unavailable         Unavailable

 

                    Feola, T Zaynab PA   Unavailable         Unavailable

 

                    Feola, T Zaynab PA   Unavailable         Unavailable

 

                    Feola, T Zaynab PA   Unavailable         Unavailable

 

                    Feola, T Zaynab PA   Unavailable         Unavailable

 

                    Feola, T Zaynab PA   Unavailable         Unavailable

 

                    Feola, T Zaynab PA   Unavailable         Unavailable

 

                    Feola, T Zaynab PA   Unavailable         Unavailable

 

                    Feola, T Zaynab PA   Unavailable         Unavailable

 

                    Feola, T Zaynab PA   Unavailable         Unavailable

 

                    Feola, T Zaynab PA   Unavailable         Unavailable

 

                    Feola, T Zaynab PA   Unavailable         Unavailable

 

                    Feola, T Zaynab PA   Unavailable         Unavailable

 

                    Feola, T Zaynab PA   Unavailable         Unavailable

 

                    Feola, T Zaynab PA   Unavailable         Unavailable

 

                    Feola, T Zaynab PA   Unavailable         Unavailable

 

                    Feola, T Zaynab PA   Unavailable         Unavailable

 

                    Feola, T Zaynab PA   Unavailable         Unavailable

 

                    Feola, T Zaynab PA   Unavailable         Unavailable

 

                    Feola, T Zaynab PA   Unavailable         Unavailable

 

                    Feola, T Zaynab PA   Unavailable         Unavailable

 

                    Feola, T Zaynab PA   Unavailable         Unavailable

 

                    Feola, T Zaynab PA   Unavailable         Unavailable

 

                    Feola, T Zaynab PA   Unavailable         Unavailable

 

                    Feola, T Zaynab PA   Unavailable         Unavailable

 

                    Feola, T Zaynab PA   Unavailable         Unavailable

 

                    Feola, T Zaynab PA   Unavailable         Unavailable

 

                    Feola, T Zaynab PA   Unavailable         Unavailable

 

                    Feola, T Zaynab PA   Unavailable         Unavailable

 

                    Feola, T Zaynab PA   Unavailable         Unavailable

 

                    Feola, T Zaynab PA   Unavailable         Unavailable

 

                    Ali, Mohsin MD     Unavailable         Unavailable

 

                    Ali, Mohsin MD     Unavailable         Unavailable

 

                    Ali, Mohsin MD     Unavailable         Unavailable

 

                    Ali, Mohsin MD     Unavailable         Unavailable

 

                    Ali, Mohsin MD     Unavailable         Unavailable

 

                    Ali, Mohsin MD     Unavailable         Unavailable

 

                    Ali, Mohsin MD     Unavailable         Unavailable

 

                    Ali, Mohsin MD     Unavailable         Unavailable

 

                    Ali, Mohsin MD     Unavailable         Unavailable

 

                    Ali, Mohsin MD     Unavailable         Unavailable

 

                    Ali, Mohsin MD     Unavailable         Unavailable

 

                    Ali, Mohsin MD     Unavailable         Unavailable

 

                    Ali, Mohsin MD     Unavailable         Unavailable

 

                    Ali, Mohsin MD     Unavailable         Unavailable

 

                    Ali, Mohsin MD     Unavailable         Unavailable

 

                    Ali, Mohsin MD     Unavailable         Unavailable

 

                    Ali, Mohsin MD     Unavailable         Unavailable

 

                    Ali, Mohsin MD     Unavailable         Unavailable

 

                    Ali, Mohsin MD     Unavailable         Unavailable

 

                    Ali, Mohsin MD     Unavailable         Unavailable

 

                    Ali, Mohsin MD     Unavailable         Unavailable

 

                    Ali, Mohsin MD     Unavailable         Unavailable

 

                    Ali, Mohsin MD     Unavailable         Unavailable

 

                    Ali, Mohsin MD     Unavailable         Unavailable

 

                    Ali, Mohsin MD     Unavailable         Unavailable

 

                    Ali, Mohsin MD     Unavailable         Unavailable

 

                    Ali, Mohsin MD     Unavailable         Unavailable

 

                    Ali, Mohsin MD     Unavailable         Unavailable

 

                    Ali, Mohsin MD     Unavailable         Unavailable

 

                    Ali, Mohsin MD     Unavailable         Unavailable

 

                    Ali, Mohsin MD     Unavailable         Unavailable

 

                    Ali, Mohsin MD     Unavailable         Unavailable

 

                    Ali, Mohsin MD     Unavailable         Unavailable

 

                    Ali, Mohsin MD     Unavailable         Unavailable

 

                    Ali, Mohsin MD     Unavailable         Unavailable

 

                    Ali, Mohsin MD     Unavailable         Unavailable

 

                    Ali, Mohsin MD     Unavailable         Unavailable

 

                    Ali, Mohsin MD     Unavailable         Unavailable

 

                    Ali, Mohsin MD     Unavailable         Unavailable

 

                    Ali, Mohsin MD     Unavailable         Unavailable

 

                    Ali, Mohsin MD     Unavailable         Unavailable

 

                    Ali, Mohsin MD     Unavailable         Unavailable

 

                    Ali, Mohsin MD     Unavailable         Unavailable

 

                    Ali, Mohsin MD     Unavailable         Unavailable

 

                    Ali, Mohsin MD     Unavailable         Unavailable

 

                    Ali, Mohsin MD     Unavailable         Unavailable

 

                    Ali, Mohsin MD     Unavailable         Unavailable

 

                    Ali, Mohsin MD     Unavailable         Unavailable

 

                    Ali, Mohsin MD     Unavailable         Unavailable

 

                    Ali, Mohsin MD     Unavailable         Unavailable

 

                    Ali, Mohsin MD     Unavailable         Unavailable

 

                    PICKERAL JR, J GRACIA PA-C Unavailable         Unavailable

 

                    PICKERAL JR, J GRACAI PA-C Unavailable         Unavailable

 

                    PICKERAL JR, J GRACIA PA-C Unavailable         Unavailable

 

                    PICKERAL JR, J GRACIA PA-C Unavailable         Unavailable

 

                    PICKERAL JR, J GRACIA PA-C Unavailable         Unavailable

 

                    PICKERAL JR, J GRACIA PA-C Unavailable         Unavailable

 

                    PICKERAL JR, J GRACIA PA-C Unavailable         Unavailable

 

                    PICKERAL JR, J GRACIA PA-C Unavailable         Unavailable

 

                    PICKERAL JR, J GRACIA PA-C Unavailable         Unavailable

 

                    PICKERAL JR, J GRACIA PA-C Unavailable         Unavailable

 

                    PICKERAL JR, J GRACIA PA-C Unavailable         Unavailable

 

                    PICKERAL JR, J GRACIA PA-C Unavailable         Unavailable

 

                    PICKERAL JR, J GRACIA PA-C Unavailable         Unavailable

 

                    PICKERAL JR, J GRACIA PA-C Unavailable         Unavailable

 

                    PICKERAL JR, J GRACIA PA-C Unavailable         Unavailable

 

                    PICKERAL JR, J GRACIA PA-C Unavailable         Unavailable

 

                    PICKERAL JR, J GRACIA PA-C Unavailable         Unavailable

 

                    PICKERAL JR, J GRACIA PA-C Unavailable         Unavailable

 

                    PICKERAL JR, J GRACIA PA-C Unavailable         Unavailable

 

                    PICKERAL JR, J GRACIA PA-C Unavailable         Unavailable

 

                    PICKERAL JR, J GRACIA PA-C Unavailable         Unavailable

 

                    PICKERAL JR, J GRACIA PA-C Unavailable         Unavailable

 

                    PICKERAL JR, J GRACIA PA-C Unavailable         Unavailable

 

                    PICKERAL JR, J GRACIA PA-C Unavailable         Unavailable

 

                    PICKERAL JR, J GRACIA PA-C Unavailable         Unavailable

 

                    PICKERAL JR, J GRACIA PA-C Unavailable         Unavailable

 

                    PICKERAL JR, J GRACIA PA-C Unavailable         Unavailable

 

                    YoungKiara Nory PA-C Unavailable         Unavailable

 

                    YoungKiara Nory PA-C Unavailable         Unavailable

 

                    YoungKiara Nory PA-C Unavailable         Unavailable

 

                    YoungKiara Nory PA-C Unavailable         Unavailable

 

                    YoungKiara Nory PA-C Unavailable         Unavailable

 

                    YoungKiara Nory PA-C Unavailable         Unavailable

 

                    YoungKiara Nory PA-C Unavailable         Unavailable

 

                    Young, Kiara Nory PA-C Unavailable         Unavailable

 

                    Young, Kiara Nory PA-C Unavailable         Unavailable

 

                    Young, Kiara Nory PA-C Unavailable         Unavailable

 

                    Young, Kiara Nory PA-C Unavailable         Unavailable

 

                    Young, Kiara Nory PA-C Unavailable         Unavailable

 

                    Young, Kiara Nory PA-C Unavailable         Unavailable

 

                    Young, Kiara Nory PA-C Unavailable         Unavailable

 

                    Young, Kiara Nory PA-C Unavailable         Unavailable

 

                    Young, Kiara Nory PA-C Unavailable         Unavailable

 

                    Young, Kiara Nory PA-C Unavailable         Unavailable

 

                    Young, Kiara Nory PA-C Unavailable         Unavailable

 

                    Young, Kiara Nory PA-C Unavailable         Unavailable

 

                    Young, Kiara Nory PA-C Unavailable         Unavailable

 

                    Young, Kiara Nory PA-C Unavailable         Unavailable

 

                    Young, Kiara Nory PA-C Unavailable         Unavailable

 

                    Young, Kiara Nory PA-C Unavailable         Unavailable

 

                    Young, Kiara Nory PA-C Unavailable         Unavailable

 

                    Young, Kiara Nory PA-C Unavailable         Unavailable

 

                    DESJARLAIS,  NIGHAT NP Unavailable         Unavailable

 

                    DESJARLAIS,  NIGHAT NP Unavailable         Unavailable

 

                    DESJARLAIS,  NIGHAT NP Unavailable         Unavailable

 

                    DESJARLAIS,  NIGHAT NP Unavailable         Unavailable

 

                    DESJARLAIS,  NIGHAT NP Unavailable         Unavailable

 

                    DESJARLAIS,  NIGHAT NP Unavailable         Unavailable

 

                    DESJARLAIS,  NIGHAT NP Unavailable         Unavailable

 

                    DESJARLAIS,  NIGHAT NP Unavailable         Unavailable

 

                    DESJARLAIS,  NIGHAT NP Unavailable         Unavailable

 

                    DESJARLAIS,  NIGHAT NP Unavailable         Unavailable



                                  



Re-disclosure Warning

          The records that you are about to access may contain information from 
federally-assisted alcohol or drug abuse programs. If such information is 
present, then the following federally mandated warning applies: This information
has been disclosed to you from records protected by federal confidentiality 
rules (42 CFR part 2). The federal rules prohibit you from making any further 
disclosure of this information unless further disclosure is expressly permitted 
by the written consent of the person to whom it pertains or as otherwise 
permitted by 42 CFR part 2. A general authorization for the release of medical 
or other information is NOT sufficient for this purpose. The Federal rules 
restrict any use of the information to criminally investigate or prosecute any 
alcohol or drug abuse patient.The records that you are about to access may 
contain highly sensitive health information, the redisclosure of which is 
protected by Article 27-F of the [facility] Public Health law. If you 
continue you may have access to information: Regarding HIV / AIDS; Provided by 
facilities licensed or operated by the [facility] Office of Mental Health; 
or Provided by the [facility] Office for People With Developmental 
Disabilities. If such information is present, then the following [place] 
mandated warning applies: This information has been disclosed to you from 
confidential records which are protected by state law. State law prohibits you 
from making any further disclosure of this information without the specific 
written consent of the person to whom it pertains, or as otherwise permitted by 
law. Any unauthorized further disclosure in violation of state law may result in
a fine or [facility] sentence or both. A general authorization for the release of 
medical or other information is NOT sufficient authorization for further disc
losure.                                                                         
    



Family History

          



             Family Member Name Family Member Gender Family Member Status Date o

f Status 

Description                             Data Source(s)

 

           Unknown    Unknown    Problem                          [facility] ([facility])



                                                                                
       



Encounters

          



           Encounter  Providers  Location   Date       Indications Data Source(s

)

 

                Outpatient      Attender: Nory Young PA-C                 10/2

2021 12:40:35 PM EDT - 10/25/2021

01:50:18 PM EDT                                     [facility] ([facility]

)

 

                Outpatient      Attender: Edgardo BAIRD                 10/23/20

21 08:31:49 AM EDT - 10/23/2021 

08:58:46 AM EDT                                     [facility] ([facility]

)

 

           Outpatient Attender: NIGHAT KWON NP            10/20/2021 11:

00:00 AM [facility]

 

                Outpatient      Attender: Izzy Alva  11:20:00 AM 

EDT                                                 [facility] ([facility]

)

 

           Outpatient Attender: NIGHAT KWON NP            2021 01:

00:00 PM [facility]

 

           Outpatient Attender: NIGHAT KWON NP            2021 04:

40:00 PM [facility]

 

                Outpatient      Attender: Mohsin Ali MD Main office - [facility] 

2021 02:30:00 

PM EDT                                              [facility] ([facility]

 

                Outpatient      Attender: MECHE LIMA MD Main Office    

 07/15/2021 02:15:00 PM 

EDT                                                 [facility]

 

           Outpatient Attender: NIGHAT KWON NP            2021 02:

20:00 PM [facility]

 

           Outpatient Attender: NIGHAT KWON NP            2021 01:

20:00 PM [facility]

 

           Outpatient Attender: NIGHAT KWON NP            2021 04:

00:00 PM [facility]

 

                Outpatient      Attender: GRACIA PHILLIPS JR Preet Alva 0

2021 11:20:00 AM

EDT                                                 [facility] ([facility]

)

 

           Outpatient Attender: NIGHAT KWON NP            03/10/2021 08:

28:00 AM [facility]

 

                Outpatient      Attender: REID DOBBINS NP                  12:32:17 PM EST - 2021

01:26:51 PM EST                                     [facility] ([facility]

)

 

           Outpatient Attender: NIGHAT KWON NP            2021 04:

40:00 PM [facility]

 

                Outpatient      Attender: Zaynab BAIRD                 02/15/2

021 04:42:41 PM EST - 02/15/2021 

05:29:27 PM EST                                     [facility] ([facility]

)

 

                Outpatient      Attender: Zaynab BAIRD                 02/10/2

021 04:49:56 PM EST - 02/10/2021 

05:44:24 PM EST                                     [facility] ([facility]

)

 

           Outpatient Attender: NIGHAT KWON NP            2021 04:

40:00 PM [facility]

 

                Outpatient      Attender: MECHE LIMA MD Main Office    

 2021 01:15:00 PM 

EST                                                 [facility] ([facility]

 

           Outpatient Attender: NIGHAT KWON NP            12/10/2020 04:

40:00 PM [facility]

 

           Outpatient Attender: NIGHAT KWON NP            10/23/2020 04:

40:00 PM [facility]

 

                Outpatient      Attender: Mohsin Ali MD Main office [facility] 

10/23/2020 09:30:00 

AM EDT                                              [facility] ([facility]

 

                Outpatient      Attender: MECHE LIMA MD Main Office    

 10/22/2020 10:15:00 AM 

EDT                                                 [facility] ([facility]



                                                                                
                                                                                
                                                                                
                                                                 



Immunizations

          



             Vaccine      Date         Status       Description  Data Source(s)

 

             COVID-19 VACCINE Pfizer 2021 12:00:00 AM EDT completed       

          [facility]

 

                                        Vaccine Series Complete: YESThis Data wa

s Submitted to [facility]. 

 

             COVID-19 VACCINE Pfizer 2021 12:00:00 AM EST completed       

          [facility]

 

                                        Vaccine Series Complete: NOThis Data was

 Submitted to [facility]. 

 

                                        This CVX code allows reporting of a vacc

ination when formulation is unknown (for

example, when recording a Influenza vaccination when noted on a vaccination 
card)           2021 09:45:00 AM EST completed                       [facility]

T ([facility])



                                                                                
                           



Medications

          



          Medication Brand Name Start Date Product Form Dose      Route     Admi

nistrative 

Instructions Pharmacy Instructions Status     Indications Reaction   Description

 Data 

Source(s)

 

                          14 ACTUAT fluticasone furoate 0.1 MG/ACTUAT Dry Powder

 Inhaler [Arnuity] Arnuity

 Ellipta 2020 12:00:00 AM EST             RESPIRATORY             active  

                 [facility] 

([facility])

 

                          120 ACTUAT mometasone furoate 0.1 MG/ACTUAT Metered Do

se Inhaler [Asmanex] 

Asmanex HFA 10/27/2020 12:00:00 AM EDT             RESPIRATORY             compl

eted                   

[facility] ([facility])

 

        zonisamide 50 MG Oral Capsule Zonisamide 10/23/2020 12:00:00 AM EDT     

            ORAL             

             active                                              [facility] ([facility]

 

          Flonase Allergy Relief Flonase Allergy Relief 10/22/2020 12:00:00 AM E

DT                                

                      completed                                   [facility] ([facility]

 

                          14 ACTUAT fluticasone furoate 0.1 MG/ACTUAT Dry Powder

 Inhaler [Arnuity] Arnuity

 Ellipta 10/22/2020 12:00:00 AM EDT             RESPIRATORY             complete

d                   [facility] 

([facility])



                                                                                
                            



Insurance Providers

          



             Payer name   Policy type / Coverage type Policy ID    Covered party

 ID Covered 

party's relationship to chung Policy Chung             Plan Information

 

          [facility] 010/510           [id number]           HU2          

       [id number]

 

                [place] [employer]BS   Health Maintenance Organization (HMO) [id number]

[id number]    

[id number]                                         

[id number]

 

          [employer] [place] 010/510           [id number]           2          

       [id number]

 

          BLUE CROSS           [id number]           SP                  [id number]

[id number]

 

                [employer] [place] [employer] Medigap Part B  [id number]    

[id number] Family Dependent                        

[id number]

 

          [employer] [employer] Commercial           [id number]     Family Dependent  

          

 

                BS [place] Trad/MX Commercial      [id number]    

..[id number] Family Dependent                        

[id number]

 

          BC/BS Of [facility]           [id number]    Family Depende

nt            

 

          [employer] [place] 010/510           [id number]           2          

       [id number]

 

          [employer] [id number]           Spouse 

             [id number]

 

          [employer] Commercial [employer] Co. [id number]           Spouse 

             [id number]

 

          BCBS                ..[id number] Blue Cross/Bl

ue Shield           

..[id number]

 

          [place]              .[id number] Commercial Insur

ance Co.           

.[id number]

 

          [employer]           [id number]           SPO         

        [id number]

 

                [employer] [place] Commercial      [id number]       

[id number] Family Dependent                        

[id number]

 

                [employer]      [id number]       

.[id number] Family Dependent                        

[id number]

 

          SELF PAY ONLY           0                   SP                  0

 

                [employer]      [id number]       

[id number] Family Dependent                        

[id number]

 

                [employer]      [id number]       

[id number] Family Dependent                        

[id number]

 

                    O         UNAVAILABLE                               UNAVAILA

BLE

 

          BCBS [place] /[address]           [id number]           HU2      

           [id number]

 

          [employer] [employer]BS B         [id number] P                   D

[id number]

 

          [employer]BS [place] WATN /307           [id number]           HU2      

           [id number]

 

          BCBS[employer] Commercial           [id number]     Family Dependent           

 

 

          [place]           [id number]    Family Dependent    

        

 

          BLUE CROSS BLUE SHIELD-O/P           [id number]           01        

          [id number]

 

          BCBS E[employer]IRE [place]           [id number]           HU2           

      [id number]

 

                              [id number]                               [id number]

[id number]

 

          [employer] [facility]           [id number]           HU2                 89

[id number]

 

          [employer]           [id number]           SPO            

     [id number]

 

          [employer]           [id number]           SPO            

     [id number]

 

          BCBS [employer] [place] [employer]           [id number]           HU2           

      [id number]

 

          [employer]           [id number]           SP                  [id number]

[id number]

 

          [employer] O         [id number] S                   89

[id number]

 

          BCBS [employer] [place] [employer]           [id number]           HU2            

     [id number]

 

          [employer] [place] 010/510           [id number]           HU2          

       [id number]

 

                [employer] [place] Commercial      [id number]       

[id number] Family Dependent                        

[id number]

 

                [employer] [facility] [employer] Commercial      [id number]       

[id number] Family Dependent                        

[id number]



                                                                                
                                                                                
                                                                                
                                                                                
                                                                                
                                                          



Problems, Conditions, and Diagnoses

          



           Code       Display Name Description Problem Type Effective Dates Data

 Source(s)

 

             G47.00       Insomnia, unspecified INSOMNIA, UNSPECIFIED Diagnosis 

   2021 01:00:00

 PM [facility]

 

                    F43.23              Adjustment disorder with mixed anxiety a

nd depressed mood ADJUSTMENT 

DISORDER WITH MIXED ANXIETY AND DEPRESS Diagnosis           2021 04:40:00 

PM [facility]

 

                    F90.1               Attention-deficit hyperactivity disorder

, predominantly hyperactive type 

ATTN-DEFCT HYPERACTIVITY DISORDER, PREDOM HYPERACT Diagnosis                  04:40:00

 PM [facility]

 

             F41.1        Generalized anxiety disorder GENERALIZED ANXIETY DISOR

MEE Diagnosis    

2021 04:40:00 PM [facility]

 

                    F90.9               Attention-deficit hyperactivity disorder

, unspecified type ATTENTION-

DEFICIT HYPERACTIVITY DISORDER, UNSPECIFIED TYPE Diagnosis                  02:20:00 

PM [facility]



                                                                                
                                                          



Surgeries/Procedures

          



             Procedure    Description  Date         Indications  Data Source(s)

 

             OFFICE OUTPATIENT VISIT 25 MINUTES              2021 12:00:00

 AM EDT              [facility] 

([facility])

 

             OFFICE OUTPATIENT VISIT 25 MINUTES              2021 12:00:00

 AM EDT              [facility] ([facility] )

 

             BRNCDILAT RSPSE SPMTRY PRE&POST-BRNCDILAT ADMN              07/15/2

021 12:00:00 AM EDT              

[facility] ([facility]

 

             OFFICE OUTPATIENT VISIT 15 MINUTES              07/15/2021 12:00:00

 AM EDT              [facility] 

([facility]

 

             OFFICE OUTPATIENT VISIT 25 MINUTES              2021 12:00:00

 AM EDT              [facility] 

([facility])

 

             BRNCDILAT RSPSE SPMTRY PRE&POST-BRNCDILAT ADMN              

021 12:00:00 AM EST              

[facility] ([facility])

 

             PERCUTANEOUS TESTS W/ALLERGENIC EXTRACTS              2021 12

:00:00 AM EST              [facility] 

([facility])

 

             NITRIC OXIDE  GAS DETERMINATION              2021 12:0

0:00 AM EST              [facility] 

([facility])

 

             INTRACUTANEOUS TESTS W/ALLERGENIC EXTRACTS              2021 

12:00:00 AM EST              [facility]

 ([facility])



                                                                                
                                                                                
        



Results

          



                    ID                  Date                Data Source

 

                    [id number]          2021 01:36:00 AM EST [facility] ([facility])









          Name      Value     Range     Interpretation Code Description Data Adelaida

rce(s) Supporting 

Document(s)

 

           Gats Culture (Neg Strep SCR) Laboratory test result                  

                [facility] ([facility])                              

 

                                        FULL REPORT IN LAB NOTES ([facility] and [facility]

).

NEGATIVE FOR STREP PYOGENES (GROUP A)



 









                    ID                  Date                Data Source

 

                    [id number]          11/10/2021 11:43:00 PM EST [facility] ([facility])









          Name      Value     Range     Interpretation Code Description Data Adelaida

rce(s) Supporting 

Document(s)

 

                          Choriogonadotropin.beta subunit [Moles/volume] in Seru

m or Plasma Laboratory 

test result                                         [facility] ([facility]

)  

 

                                        <content>QUANTITATIVE RESULT          QU

ALITATIVE 

INTERPRETATION</content><br/><content>-------------------          
--------------------------</content><br/><content><5.0 IU/L                     
   NEGATIVE</content><br/><content>5.0 - 25.0 IU/L                  
INDETERMINATE</content><br/><content>>25.0 IU/L                       
POSITIVE</content><br/><content></content> 









                    ID                  Date                Data Source

 

                    [id number]          11/10/2021 11:40:00 PM EST [facility] ([facility])









          Name      Value     Range     Interpretation Code Description Data Adelaida

rce(s) Supporting 

Document(s)

 

           Laboratory test finding (navigational concept) 41.0 %     38.0-51.0  

                      [facility] 

([facility])                   

 

           Laboratory test finding (navigational concept) 140 meq/L  136-145    

                      [facility] 

([facility]                   

 

           Laboratory test finding (navigational concept) 83 mg/dL        

                      [facility] 

([facility]                   

 

           Laboratory test finding (navigational concept) 4.7 mg/dL  4.5-5.3    

                      [facility] 

([facility]                   

 

           Laboratory test finding (navigational concept) 102 meq/L       

                      [facility] 

([facility]                   

 

           Laboratory test finding (navigational concept) 3.4 meq/L  3.5-5.1    

                      [facility] 

([facility]                   

 

           Laboratory test finding (navigational concept) 6 mg/dL    8-26       

                      [facility] 

([facility])                   

 

           Laboratory test finding (navigational concept) 27.0 MM/L  23.0-27.0  

                      [facility] 

([facility])                   

 

           Laboratory test finding (navigational concept) 0.7 mg/dL  0.6-1.3    

                      [facility] 

([facility])                   









                    ID                  Date                Data Source

 

                    [id number]         10/23/2021 08:45:[facility]









          Name      Value     Range     Interpretation Code Description Data Adelaida

rce(s) Supporting 

Document(s)

 

          SARS-CoV-2 RNA Resp Ql TAMMY+probe NOT DETECTED                         

      [facility]     

 

                                        This lab was ordered by RAUL hendricks and reported by RAUL Beckham. 









                    ID                  Date                Data Source

 

                    [id number]          2021 08:45:00 AM EDT [facility] ([facility])









          Name      Value     Range     Interpretation Code Description Data Adelaida

rce(s) Supporting 

Document(s)

 

          Lisbeth (Hep2) Laboratory test result                               [facility]

 ([facility]  

 

                                        <content>Negative   <1:80</content><br/>

<content>Borderline  

1:80</content><br/><content>Positive   >1:80</content><br/><content>ICAP 
nomenclature: AC-0</content><br/><content>For more information about Hep-2 cell 
patterns use</content><br/><content>ANApatterns.org, the official website for 
the</content><br/><content>International Consensus on Antinuclear Antibody 
(LISBETH)</content><br/><content>Patterns (ICAP).</content><br/><content>Performed 
at:  [facility]</content><br/><content>[address], [place]  [id number]</content><br/><content>: Anna Cervantes MD, Phone:  
[phone]</content><br/><content>Performed at:  [facility] 
[place]</content><br/><content>[address], [place]  
[id number]</content><br/><content>: Araceli B Reyes MD, Phone:  
[phone]</content><br/><content></content> 









                    ID                  Date                Data Source

 

                    [id number]          2021 08:45:00 AM EDT [facility]









          Name      Value     Range     Interpretation Code Description Data Adelaida

rce(s) Supporting 

Document(s)

 

           Phosphate [Moles/volume] in Serum or Plasma 2.1 mg/dL  2.5-4.9       

                   [facility] 

([facility]                   

 

           Erythrocyte sedimentation rate by Westergren method 49 mm/hr   0-20  

                           [facility] 

([facility])                   

 

           Magnesium [Moles/volume] in Serum or Plasma 2.3 mg/dL  1.8-2.4       

                   [facility] 

([facility])                   

 

             Creatine kinase [Enzymatic activity/volume] in Serum or Plasma 43 U

/L                            

                          [facility] ([facility])  

 

           Iron [Mass/volume] in Serum or Plasma 64 ug/dL                 

             [facility] ([facility])                              

 

           Calcidiol [Mass/volume] in Serum or Plasma 30.4 ng/mL 30.0-100.0     

                  [facility] 

([facility])                   

 

           Cobalamin (Vitamin B12) [Mass/volume] in Serum or Plasma 432 pg/mL  2

                          

[facility] ([facility])            

 

                                        VITAMIN B12 NORMAL RANGE



NORMAL                     247 - 911 PG/ML

INDETERMINATE              211 - 246 PG/ML

DEFICIENT              LESS THAN 211 PG/ML

 

 

                          Thyrotropin [Units/volume] in Serum or Plasma by Detec

tion limit <= 0.05 mIU/L 

3.370 uIU/ML 0.358-3.740                            [facility] ([facility]

)  

 

           Rheumatoid Factor Quant Laboratory test result                       

           [facility] ([facility])                              

 

                          C reactive protein [Mass/volume] in Serum or Plasma by

 High sensitivity method 

1.29 mg/dL   0.00-0.30                              [facility] ([facility]

)  

 

                    Cyclic citrullinated peptide IgG Ab [Units/volume] in Serum 

or Plasma 7 units             

0-19                                            [facility] ([facility])  

 

                                        <content>Negative               <20</con

tent><br/><content>Weak positive      20

 - 39</content><br/><content>Moderate positive  40 - 
59</content><br/><content>Strong positive        >59</content><br/>
<content></content> 









                    ID                  Date                Data Source

 

                    [id number]            2021 06:16:00 PM EST [facility] 

[facility]









          Name      Value     Range     Interpretation Code Description Data Adelaida

rce(s) Supporting 

Document(s)

 

           BHD COVID-19 RT-PCR NASAL SWAB Not Detected Not Detected             

          [facility]                              

 

                                        This test has received Emergency Use Aut

horization (EUA). We willcontinue to 

follow federal and state requirements for COVID-19reporting. This test was 
developed and its performance characteristicsdetermined by [facility]. It has not been cleared orapproved by the U.S. Food and Drug 
Administration but has been givenemergency use authorization. Results should be 
used in conjunctionwith clinical findings and should not form the sole basis for
 adiagnosis or treatment decision. Methods: SARS-CoV-2 Multiplex RT-PCRAssayA 
not detected (negative) test result for this test means that SARS-CoV-2 RNA was 
not present in the specimen above the limit ofdetection. Laboratory test results
 should always be considered in thecontext of clinical observations and 
epidemiological data in making afinal diagnosis and patient management 
decisions. Results will bereported to government agencies as required. 









                    ID                  Date                Data Source

 

                    [id number]            2021 01:00:00 PM EST [facility]









          Name      Value     Range     Interpretation Code Description Data Adelaida

rce(s) Supporting 

Document(s)

 

                                        SARS coronavirus 2 RNA [Presence] in Res

piratory specimen by TAMMY with probe 

detection  NEGATIVE                                    [facility]      

 

                                        This lab was ordered by [facility] and reported by [facility]. 









                    ID                  Date                Data Source

 

                    [id number]       2021 12:00:00 AM EST [facility]









          Name      Value     Range     Interpretation Code Description Data Adelaida

rce(s) Supporting 

Document(s)

 

          Carestart Rapid COVID Antigen Test Negative                           

     [facility]     

 

                                        [facility] 









                    ID                  Date                Data Source

 

                    [id number]            02/10/2021 12:00:00 AM EST [facility]









          Name      Value     Range     Interpretation Code Description Data Adelaida

rce(s) Supporting 

Document(s)

 

                                        SARS coronavirus 2 RNA [Presence] in Res

piratory specimen by TAMMY with probe 

detection  NEGATIVE                                    [facility]      

 

                                        This lab was ordered by [facility] and reported by [facility]. 









                    ID                  Date                Data Source

 

                    [id number]       02/10/2021 12:00:00 AM EST [facility]









          Name      Value     Range     Interpretation Code Description Data Adelaida

rce(s) Supporting 

Document(s)

 

          Carestart Rapid COVID Antigen Test Negative                           

     [facility]     

 

                                        This lab was reported by [facility] abdullahi. 









                    ID                  Date                Data Source

 

                    [id number]          2021 12:24:00 PM EST [facility] ([facility])









          Name      Value     Range     Interpretation Code Description Data Adelaida

rce(s) Supporting 

Document(s)

 

           Phosphate [Moles/volume] in Serum or Plasma 2.2 mg/dL  2.5-4.9       

                   [facility] 

([facility])                   

 

           Magnesium [Moles/volume] in Serum or Plasma 2.2 mg/dL  1.8-2.4       

                   [facility] 

([facility])                   

 

             Creatine kinase [Enzymatic activity/volume] in Serum or Plasma 63 U

/L                            

                          [facility] ([facility])  

 

           Cobalamin (Vitamin B12) [Mass/volume] in Serum or Plasma 410 pg/mL  2

                          

[facility] ([facility])            

 

                                        VITAMIN B12 NORMAL RANGE



NORMAL                     247 - 911 PG/ML

INDETERMINATE              211 - 246 PG/ML

DEFICIENT              LESS THAN 211 PG/ML

 

 

           Calcidiol [Mass/volume] in Serum or Plasma 47.0 ng/mL 30.0-100.0     

                  [facility] 

[facility]                   









                    ID                  Date                Data Source

 

                    [id number]          2020 08:50:00 PM EDT [facility] ([facility])









          Name      Value     Range     Interpretation Code Description Data Adelaida

rce(s) Supporting 

Document(s)

 

                          Natriuretic peptide.B prohormone N-Terminal [Mass/volu

me] in Serum or Plasma 10 

pg/mL                                               [facility] ([facility]

)  

 

           Thyroxine (T4) Ab [Units/volume] in Serum 8.9 ug/dL  4.5-12.0        

                 [facility] 

([facility])                   

 

                          Thyrotropin [Units/volume] in Serum or Plasma by Detec

tion limit <= 0.05 mIU/L 

2.670 uIU/ML 0.358-3.740                            [facility] ([facility]

)  

 

           HCG Serum Qualitative Laboratory test result                         

         [facility] ([facility])

                                         

 

           Bacteria identified in Blood by Culture Laboratory test result       

                           [facility] 

([facility])                   

 

                                        No growth after 72 hours . All specimens

 observed

for 5 days. Results final at that time.



No growth after 48 hours . All specimens observed

for 5 days. Results final at that time.



No growth after 24 hours . All specimens observed

for 5 days. Results final at that time.



NO GROWTH AFTER 5 DAYS

 









                    ID                  Date                Data Source

 

                    [id number]          2020 08:50:00 PM EDT [facility] ([facility])









          Name      Value     Range     Interpretation Code Description Data Adelaida

rce(s) Supporting 

Document(s)

 

          Glucose, Fasting 88 mg/dL                          [facility] ([facility]  

 

          Blood Urea Nitrogen 9 mg/dL   7-18                          [facility] ([facility])  

 

          Creatinine For GFR 0.90 mg/dL 0.55-1.30                     [facility] ([facility])  

 

           Glomerular Filtration Rate Laboratory test result                    

              [facility]                              

 

                                        <content>Units are mL/min/1.73 

m2</content><br/><content></content><br/><content>Chronic Kidney Disease Staging
 per NKF:</content><br/><content></content><br/><content>Stage I & II   GFR >=60
       Normal to Mildly Decreased</content><br/><content>Stage III      GFR 30-
59      Moderately Decreased</content><br/><content>Stage IV       GFR 15-29    
  Severely Decreased</content><br/><content>Stage V        GFR <15        Very 
Little GFR Left</content><br/><content>ESRD           GFR <15 on 
RRT</content><br/><content></content> 

 

          Sodium Level 137 meq/L 136-145                       [facility] ([facility])  

 

          Potassium Serum 4.0 meq/L 3.5-5.1                       [facility] ([facility])  

 

          Carbon Dioxide Level 24 meq/L  21-32                         [facility]  

 

          Anion Gap 6 meq/L   8-16                          [facility]  

 

          Chloride Level 107 meq/L                         [facility] ([facility])  

 

          Calcium Level 9.2 mg/dL 8.5-10.1                      [facility] ([facility])  









                    ID                  Date                Data Source

 

                    [id number]          2020 08:50:00 PM EDT [facility] ([facility])









          Name      Value     Range     Interpretation Code Description Data Adelaida

rce(s) Supporting 

Document(s)

 

          Alt/SGPT  23 U/L    12-78                         [facility]  

 

          Ast/Sgot  11 U/L    7-37                          [facility])  

 

           Bilirubin,Direct Laboratory test result 0.0-0.2                      

    [facility] ([facility]                              

 

          Alkaline Phosphatase 152 U/L                           [facility] ([facility])  

 

          Bilirubin,Total 0.2 mg/dL 0.2-1.0                       [facility] ([facility])  

 

          Total Protein 7.5 GM/DL 6.4-8.2                       [facility] ([facility])  

 

          Albumin   3.2 GM/DL 3.2-5.2                       [facility])  

 

          Albumin/Globulin Ratio 0.7       1.2-2.2                       [facility] 

([facility])  









                    ID                  Date                Data Source

 

                    [id number]          2020 08:50:00 PM EDT [facility] ([facility])









          Name      Value     Range     Interpretation Code Description Data Adelaida

rce(s) Supporting 

Document(s)

 

          CPK Creatine Phosphokinase 63 U/L                            [facility]

[facility] ([facility])  

 

          MB/CK Relative Index 1.59                                    [facility] ([facility])  

 

                                        <content>DIAGNOSIS CRITERIA</content><br

/><content>MMB ng/ml       Relative 

Index (RI)</content><br/><content>NON-AMI               < or = 5               
N/A</content><br/><content>GRAY ZONE              > 5                < or = 
4</content><br/><content>AMI                    > 5                   > 
4</content><br/><content></content> 

 

          Troponin I Laboratory test result                               [facility]

 ([facility])  

 

                                        <content>Troponin I Reference Interval f

or Siemens [place] 

LOCI:</content><br/><content></content><br/><content>99th Percentile= 0.00-0.045
 ng/ml</content><br/><content></content><br/><content>Risk 
Stratification:</content><br/><content><= 0.10 ng/ml   Decreased Risk for 
Adverse Clinical</content><br/><content>Events.</content><br/><content>0.10-1.50
 ng/ml   Increased Risk for Adverse Clinical</content><br/><content>Events. 
Evaluation of additional</content><br/><content>criterion and/or repeat testing 
in 2-6</content><br/><content>hours is suggested to rule out 
myocardial</content><br/><content>damage.</content><br/><content>>= 1.50 ng/ml  
 Indicative of Myocardial Injury.</content><br/><content></content> 

 

          CK-MB Value Mass Laboratory test result                               

[facility] ([facility])  









                    ID                  Date                Data Source

 

                    [id number]          2020 08:50:00 PM EDT [facility]









          Name      Value     Range     Interpretation Code Description Data Adelaida

rce(s) Supporting 

Document(s)

 

          Inr       1.13                                    [facility]  

 

                                        THERAPUTIC HUMAN INR VALUES

INDICATIONS                      NORMAL RANGES

PROPHYLAXIS/TREATMENT OF:

VENOUS THROMBOSIS                2.0-3.0

PULMONARY EMBOLISM               2.0-3.0

PREVENTION OF SYSTEMIC EMBOLISM FROM:

TISSUE HEART VALVES              2.0-3.0

ACUTE MYOCARDIAL INFARCTION      2.0-3.0

VALVULAR HEART DISEASE           2.0-3.0

ATRIAL FIBRILLATION              2.0-3.0

MECHANICAL VALVES(HIGH RISK)     2.5-3.5

RECURRENT MYOCARDIAL INFARCTION  2.5-3.5

 

 

          Prothrombin Time 14.7 s    12.5-14.3                     [facility]  









                    ID                  Date                Data Source

 

                    [id number]          2020 08:50:00 PM EDT [facility] ([facility])









          Name      Value     Range     Interpretation Code Description Data Adelaida

rce(s) Supporting 

Document(s)

 

          White Blood Count 8.9 10    4.0-10.0                      [facility] ([facility])  

 

          Hemoglobin 13.1 g/dL 12.0-15.5                     [facility] ([facility]

[facility])  

 

          Hematocrit 39.6 %    36.0-47.0                     [facility]  

 

          Red Blood Count 4.54 10   4.00-5.40                     [facility] ([facility])  

 

          Mean Corpuscular Hemoglobin 28.9 pg   27.0-33.0                     [facility]

[facility] ([facility]) 

 

 

          Mean Corpuscular HGB Conc 33.1 g/dL 32.0-36.5                     [facility]

[facility] ([facility]) 

 

 

          Mean Corpuscular Volume 87.2 fl   80.0-96.0                     [facility]

 ([facility])  

 

          Red Cell Distribution Width 13.2 %    11.5-14.5                     [facility]

[facility] ([facility])  

 

          Platelet Count, Automated 411 10    150-450                       [facility]

[facility] ([facility])  

 

          Neutrophils % 66.4 %    36.0-66.0                     [facility] ([facility])  

 

          Mono %    5.8 %     0.0-5.0                       [facility]  

 

          Lymph %   26.2 %    24.0-44.0                     [facility]  

 

          Eos %     0.9 %     0.0-3.0                       [facility]  

 

          Baso %    0.4 %     0.0-1.0                       [facility]  

 

          Immature Granulocyte % 0.3 %     0-3.0                         [facility] 

([facility])  

 

          Neutrophils # 5.9 10    1.5-8.5                       [facility] ([facility])  

 

          Nucleated Red Blood Cell % 0.0 %     0-0                           [facility]

[facility] ([facility])  

 

          Mono #    0.5 10    0.0-0.8                       [facility]  

 

          Lymph #   2.3 10    1.5-5.0                       [facility]  

 

          Eos #     0.1 10    0.0-0.5                       [facility]  

 

          Baso #    0.0 10    0.0-0.2                       [facility]  









                    ID                  Date                Data Source

 

                    [id number]          2020 08:45:00 PM EDT [facility] ([facility])









          Name      Value     Range     Interpretation Code Description Data Adelaida

rce(s) Supporting 

Document(s)

 

          Blood Culture Laboratory test result                               [facility]

[facility] ([facility])  

 

                                        No growth after 72 hours . All specimens

 observed

for 5 days. Results final at that time.



No growth after 48 hours . All specimens observed

for 5 days. Results final at that time.



No growth after 24 hours . All specimens observed

for 5 days. Results final at that time.



NO GROWTH AFTER 5 DAYS

 







                                        Procedure

 

                                          



                                                                                
                                                                                
                                                                                
                                      



Social History

          



           Code       Duration   Value      Status     Description Data Source(s

)

 

             Smoking      07/15/2021 12:00:00 AM EDT Patient has never smoked co

mpleted    Patient 

has never smoked                        [facility] ([facility]

)



                                                                                
                  



Vital Signs

          



                    ID                  Date                Data Source

 

                    UNK                                      









           Name       Value      Range      Interpretation Code Description Data

 Source(s)

 

           Diastolic blood pressure 80 mm[Hg]                        80 mm[Hg]  

[facility] ([facility])

 

                                        RT Arm 

 

           Systolic blood pressure 110 mm[Hg]                       110 mm[Hg] 

[facility] ([facility])

 

                                        RT Arm 

 

           Body mass index (BMI) [Ratio] 43.6 kg/m2                       43.6 k

g/m2 [facility] ([facility]

 

           Body height 64.25 [in_i]                       64.25 [in_i] [facility] ([facility])

 

                                        5'4.25" 

 

           Body weight 256.25 [lb_av]                       256.25 [lb_av] [facility]

T ([facility])

 

           Heart rate 100 /min                         100 /min   [facility] ([facility])

 

           Diastolic blood pressure 76 mm[Hg]                        76 mm[Hg]  

[facility]

 

           Body mass index (BMI) [Ratio] 43.1 kg/m2                       43.1 k

g/m2 [facility] ([facility]

 

           Body weight 251.12 [lb_av]                       251.12 [lb_av] [facility]

[facility] ([facility]

 

           Body height 64 [in_i]                        64 [in_i]  [facility] ([facility]

[facility]

 

                                        5'4" 

 

           Heart rate 128 /min                         128 /min   [facility] ([facility]

 

           Respiratory rate 20 /min                          20 /min    [facility] (

[facility])

 

           Systolic blood pressure 108 mm[Hg]                       108 mm[Hg] 

[facility] ([facility]

 

           Body mass index (BMI) [Ratio] 43.3 kg/m2                       43.3 k

g/m2 [facility] ([facility]

 

           Systolic blood pressure 100 mm[Hg]                       100 mm[Hg] M

[facility] ([facility])

 

           Body weight 254.00 [lb_av]                       254.00 [lb_av] [facility]

T ([facility])

 

           Body height 64.25 [in_i]                       64.25 [in_i] [facility] ([facility])

 

                                        5'4.25" 

 

           Diastolic blood pressure 60 mm[Hg]                        60 mm[Hg]  

[facility] ([facility])

 

           Body weight 247.12 [lb_av]                       247.12 [lb_av] [facility]

T ([facility]

 

           Body height 64 [in_i]                        64 [in_i]  [facility] ([facility]

 

                                        5'4" 

 

           Respiratory rate 18 /min                          18 /min    [facility] (

[facility])

 

           Heart rate 114 /min                         114 /min   [facility] ([facility]

 

           Systolic blood pressure 97 mm[Hg]                        97 mm[Hg]  

[facility] ([facility]

 

           Diastolic blood pressure 67 mm[Hg]                        67 mm[Hg]  

[facility] ([facility]

 

           Body mass index (BMI) [Ratio] 42.4 kg/m2                       42.4 k

g/m2 [facility] ([facility]

 

           Heart rate 99 /min                          99 /min    [facility] ([facility]

 

           Respiratory rate 16 /min                          16 /min    [facility] (

[facility])

 

           Systolic blood pressure 110 mm[Hg]                       110 mm[Hg] 

[facility] ([facility]

 

           Body weight 249.00 [lb_av]                       249.00 [lb_av] [facility]

[facility] ([facility]

 

           Body height 64 [in_i]                        64 [in_i]  [facility] ([facility])

 

                                        5'4" 

 

           Diastolic blood pressure 75 mm[Hg]                        75 mm[Hg]  

[facility] ([facility]

 

           Body mass index (BMI) [Ratio] 42.7 kg/m2                       42.7 k

g/m2 [facility] ([facility]

## 2021-11-14 NOTE — CCD
Continuity of Care Document (CCD)

                             Created on: 2021



Nora Caputo

External Reference #: [id number]

: 1980

Sex: Female



Demographics





                          Address                   [address] 

[place]  [address]

 

                          Home Phone                [phone]

 

                          Preferred Language        Unknown

 

                          Marital Status            Unknown

 

                          Episcopal Affiliation     Unknown

 

                          Race                      White

 

                          Ethnic Group              Not  or 





Author





                          Author                    Nora FERNANDO [facility]

 

                          Organization              Unknown

 

                          Address                   [address] [facility] [address]

[place]  [address]



 

                          Phone                     [phone]







Care Team Providers





                    Care Team Member Name Role                Phone

 

                    Ena Huff   AUTM                [phone]

 

                    Izzy Fernando  AUTM                [phone]







Problems





                                        Description

 

                                        No Information Available







Social History





                Type            Date            Description     Comments

 

                Birth Sex                       Unknown          

 

                ETOH Use                        Denies alcohol use  

 

                Tobacco Use     Start: Unknown  Patient has never smoked  







Allergies and adverse reactions





             Active Allergies Criticality  Reaction | Severity Comments     Date

 

             Ceftin       Unable to assess criticality              Ha          

 11/10/2015

 

             Oxycodone    Unable to assess criticality              anxiety     

 11/10/2015

 

                                        Inactive Allergies

 

             NKDA         Unable to assess criticality                          

 11/10/2015







Medications





           Active Medications SIG        Qnty       Indications Ordering Provide

r Date

 

                          Fluconazole                     150mg Tablets         

          take 1 tablet by

mouth today. repeat in 2 days 2tabs                           Jaqueline Valdez M.D. 2020

 

                          Famotidine                     40mg Tablets           

        Take 1 Tablet By 

Mouth Every Day 90tabs                          Kranthi Oviedo MD 2020

 

                Nebulizer                      Device                   use as d

irected Cristiane Thao FNP       2020

 

                          Nebulizer Kit/Tubing/Mouthpiece                      K

it                   use 

four times a day dx j44.9 Cristiane Thao FNP 

 

                          Omeprazole                     40mg Capsules DR       

            Take 1 Capsule

By Mouth Twice Daily 60caps                          JUANITA Carcamo 20

17

 

                          Zyrtec Allergy                     10mg Tablets       

            1 by mouth 

every day prn                                   JUANITA Jimenez 2016

 

             Tums                     500mg Chewtabs                   as needed

                              JUANITA Rogers                               2016

 

           Mirena                     20mcg/24HR IUD                            

                        Unknown    

2016

 

                          Xarelto                     20mg Tablets              

     Take 1 Tablet By 

Mouth Every Day 30tabs                          Jaqueline Valdez M.D. 20

16

 

                                        Hyoscyamine Sulfate ER                  

   0.375mg Tablets ER 12HR              

                Take 1 Tablet By Mouth Every 12 Hours as Directed 60tabs        

                  Izzy Fernando, 

[facility]                                     2016

 

                          Zofran                     4mg Tablets                

   1 by mouth every 6 hrs.

as needed nausea 40tabs          R19.7           Steve Tatumrine, [facility] 2015

 

                          Lithium Carbonate                     300mg Capsules  

                 one at 

bedtime                                         Unknown         

 

                          Arnuity Ellipta                     100mcg/Act Aerosol

                   1 

inhalation daily                                 Unknown         

 

                          Vyvanse                     50mg Capsules             

      Take one capsule PO 

once daily                                      Unknown         

 

                Aimovig                     140mg/ml Solution Auto-Inject       

                                            

                          Unknown                   

 

                          Benzonatate                     100mg Capsules        

           take 1 capsule 

by mouth three times a day as needed cough 60caps                          Colli

ashley Oviedo MD 



 

                          Trazodone HCL                     100mg Tablets       

            take one 

tablet by mouth at bedtime                                 Unknown         

 

                          Frovatriptan Succinate                     2.5mg Table

ts                   take 

1 tablet by mouth two times a day for 7 days before menses                      

           Unknown         



 

                          Levalbuterol Tartrate                     45mcg/Act Ae

rosol                   

inhale 2 puffs by mouth four times a day as needed 15gm                         

   Kranthi Oviedo MD 



 

                          Wellbutrin SR                     150mg Tablets ER 12H

R                   by 

mouth bid a day as directed                                 Unknown         

 

                    Risperidone                     0.5mg Tablets               

    1 by mouth tid       

                                        Unknown             

 

                          Clonazepam                     0.5mg Tablets          

         1 by mouth Twice 

a day                                           Unknown         

 

                          Montelukast Sodium                     10mg Tablets   

                Take 1 

Tablet By Mouth AT Bedtime 30tabs                          OLI Sahni JR 







Medications Administered in Office





           Medication SIG        Qnty       Indications Ordering Provider Date

 

                                        Immunization Adminstration,1 Vaccine/Tox

oid                      Injection      

                                                    Steve Etienne [facility] 2019







Immunizations





             CPT Code     Status       Date         Vaccine      Lot #

 

             U-Flu        Given        2021   Influenza,Unspecified  

 

             00132        Given        2019   Adacel- Tetanus Diphtheria P

ertussis [id number]

 

                                          

 

                09415           Refused         10/19/2018      Influenza Virus 

Vaccine, Quadrivalent (Cciiv4), Derived

From Cell                                

 

                07058           Refused         10/09/2017      Influenza Vaccin

e Quadrivalent Preser/Antibiotic Free 

Im Use                                   

 

                19081           Refused         10/09/2017      Influenza Vaccin

e Quadrivalent Preser/Antibiotic Free 

Im Use                                   







Vital Signs





                Date            Vital           Result          Comment

 

                2021 11:26am BP Systolic     110 mmHg        RT Arm

 

                    BP Diastolic        80 mmHg             RT Arm

 

                    Heart Rate          100 /min             

 

                    Height              64.25 inches        5'4.25"

 

                    Weight              256.25 lb            

 

                    BMI (Body Mass Index) 43.6 kg/m2           

 

                2021 11:29am BP Systolic     100 mmHg         

 

                    BP Diastolic        60 mmHg              

 

                    Height              64.25 inches        5'4.25"

 

                    Weight              254.00 lb            

 

                    BMI (Body Mass Index) 43.3 kg/m2           







Results





        Test    Acquired Date Facility Test    Result  H/L     Range   Note

 

                    Gats (Negative Strep Screen) 2021          [address] [id number] [phone] Gats Culture (Neg Strep SCR) FULL REPORT IN L <SEE N

OTE>  Normal       

                                        1

 

                    Laboratory test finding 11/10/2021          [address] [address]

           [phone] iSTAT B-hCG < 5.0      Normal                2

 

                    Istat Chem8+ Panel  11/10/2021          [facility]

nter

                                        [address] [place] [address] [id number] [phone] iSTAT HCT  41.0 %     Normal     38.0-51.0   

 

             iSTAT Glucose 83 mg/dL     Normal               

 

             iSTAT Sodium 140 mEq/L    Normal       136-145       

 

             iSTAT Potassium 3.4 mEq/L    Low          3.5-5.1       

 

             iSTAT CA++   4.7 mg/dL    Normal       4.5-5.3       

 

             iSTAT Chloride 102 mEq/L    Normal               

 

             iSTAT Co2    27.0 MM/L    Normal       23.0-27.0     

 

             iSTAT BUN    6 mg/dL      Low          8-26          

 

             iSTAT Creatinine 0.7 mg/dL    Normal       0.6-1.3       

 

                    Laboratory test finding 2021          [address] [id number] [phone] Erythrocyte Sedimentation Rate 49 mm/hr   High       0

-20        

 

             Phosphorus Level 2.1 mg/dL    Low          2.5-4.9       

 

             CPK Creatine Phosphokinase 43 U/L       Normal               

 

             Magnesium Level 2.3 mg/dL    Normal       1.8-2.4       

 

             Iron (Fe)    64 g/dL    Normal               

 

             Thyroid Stimulating Hormone 3.370 uIU/ML Normal       0.358-3.740  

 

 

             Vitamin B12 Level 432 pg/mL    Normal       247-911      3

 

             Total 25(Oh) Vitamin D 30.4 NG/ML   Normal       30.0-100.0    

 

             Rheumatoid Factor Quant < 10.0 IU/mL Normal       <15.0         

 

             C Reactive Protein Quantitativ 1.29 mg/dL   High         0.00-0.30 

    

 

             Cyclic Citrullinated Peptide 7 units      Normal       0-19        

 4

 

                    Krista Titer & Pattern 2021          [facility]

nter

                                        [address] [id number] [phone] Krista (Hep2) Negative   Normal     .          5







                          1                         FULL REPORT IN LAB NOTES (eC

W and [facility]).

NEGATIVE FOR STREP PYOGENES (GROUP A)





 

                          2                         QUANTITATIVE RESULT         

 QUALITATIVE INTERPRETATION

                                        -------------------          -----------

---------------

<5.0 IU/L                        NEGATIVE

                                        5.0 - 25.0 IU/L                  INDETER

MINATE

>25.0 IU/L                       POSITIVE



 

                          3                         VITAMIN B12 NORMAL RANGE



NORMAL                     247 - 911 PG/ML

INDETERMINATE              211 - 246 PG/ML

DEFICIENT              LESS THAN 211 PG/ML



 

                          4                         Negative               <20

Weak positive      20 - 39

Moderate positive  40 - 59

Strong positive        >59



 

                          5                         Negative   <1:80

Borderline  1:80

Positive   >1:80

ICAP nomenclature: AC-0

For more information about Hep-2 cell patterns use

ANApatterns.org, the official website for the

International Consensus on Antinuclear Antibody (KRISTA)

Patterns (ICAP).

Performed at:   - [facility]  [id number]

61

: Anna Cervantes MD, Phone:  [phone]

Performed at:   - [facility] [address]  [id number]

: Araceli B Reyes MD, Phone:  [phone]









Procedures





                Date            Code            Description     Status

 

                2021      09696           Office/Outpatient Established Mo

d MDM 30-39 Min Completed







Medical Devices





                                        Description

 

                                        No Information Available







Encounters





           Type       Date       Location   Provider   Dx         Diagnosis

 

             Office Visit 2021 11:20a [facility] Izzy angulo, ALAN 

G89.29                                  Other chronic pain

 

                          E55.9                     Vitamin D deficiency, unspec

ified

 

                          E78.00                    Pure hypercholesterolemia, u

nspecified

 

                          Z86.711                   Personal history of pulmonar

y embolism

 

                          Z79.01                    Long term (current) use of a

nticoagulants

 

                          F34.1                     Dysthymic disorder







Assessments





                Date            Code            Description     Provider

 

                2021      G89.29          Other chronic pain Izzy Fernando

, [facility]

 

                2021      E55.9           Vitamin D deficiency, unspecifie

d Izzy Fernando, [facility]

 

                2021      E78.00          Pure hypercholesterolemia, unspe

cified Izzy Fernando, [facility]

 

                2021      Z86.711         Personal history of pulmonary em

bolism Izzy Fernando, [facility]

 

                2021      Z79.01          Long term (current) use of antic

oagulants Izzy Fernando, [facility]

 

                2021      F34.1           Dysthymic disorder JUANITA Carcamo







Plan of Treatment

Future Appointment(s):* 2022 11:00 am - JUANITA Carcamo at [facility]

2021 - JUANITA Carcamo* G89.29 Other chronic pain* Comments:* Following
  with [facility]. CRP remains elevated, RF is negative. Dr. Rush has 
  ordered a sleep study, but the patient reports that she and Dr. Rush both be
  lieve her symptoms are caused by fibromyalgia.





* E55.9 Vitamin D deficiency, unspecified* Comments:* Not currently 
  supplemented. Last checked by rheumatology.





* E78.00 Pure hypercholesterolemia, unspecified* Comments:* Check lipids in the 
  spring at [facility]. No current pharmacotherapy. Weight loss, diet and exercise 
  discussed and reinforced.





* Z86.711 Personal history of pulmonary embolism* Comments:* On Xarelto at this 
  point.





* Z79.01 Long term (current) use of anticoagulants

* F34.1 Dysthymic disorder* Comments:* Managed by the [facility] and follows 
  there routinely; medications managed by their office.









Functional Status





                                        Description

 

                                        No Information Available







Mental Status





                                        Description

 

                                        No Information Available







Referrals





                                        Description

 

                                        No Information Available

## 2021-11-15 LAB — NT-PRO BNP: 21 PG/ML (ref ?–125)

## 2021-11-15 NOTE — ECGEPIP
[facility] - ED

                                       

                                       Test Date:    2021

Pat Name:     FREDY SAMANO          Department:   

Patient ID:   [id number]                 Room:         -

Gender:       Female                   Technician:   EVELIA

:          1980               Requested By: SAMUEL VASQUEZ PA-C

Order Number: [id number]     Reading MD:   Narendra Higgins

                                 Measurements

Intervals                              Axis          

Rate:         101                      P:            52

SC:           152                      QRS:          4

QRSD:         80                       T:            56

QT:           350                                    

QTc:          453                                    

                           Interpretive Statements

Sinus tachycardia

Possible Inferior infarct , age undetermined

SIMILAR TO 11/10/21

Electronically Signed on 11- 10:05:43 EST by Narendra Higgins

## 2022-01-17 ENCOUNTER — HOSPITAL ENCOUNTER (OUTPATIENT)
Dept: HOSPITAL 53 - M WUC | Age: 42
End: 2022-01-17
Attending: INTERNAL MEDICINE
Payer: COMMERCIAL

## 2022-01-17 DIAGNOSIS — M06.4: Primary | ICD-10-CM

## 2022-05-31 ENCOUNTER — HOSPITAL ENCOUNTER (EMERGENCY)
Dept: HOSPITAL 53 - M ED | Age: 42
Discharge: HOME | End: 2022-05-31
Payer: COMMERCIAL

## 2022-05-31 VITALS — WEIGHT: 256.84 LBS | BODY MASS INDEX: 43.85 KG/M2 | HEIGHT: 64 IN

## 2022-05-31 VITALS — SYSTOLIC BLOOD PRESSURE: 113 MMHG | DIASTOLIC BLOOD PRESSURE: 56 MMHG

## 2022-05-31 DIAGNOSIS — Z79.899: ICD-10-CM

## 2022-05-31 DIAGNOSIS — M06.9: ICD-10-CM

## 2022-05-31 DIAGNOSIS — G43.909: ICD-10-CM

## 2022-05-31 DIAGNOSIS — K35.890: Primary | ICD-10-CM

## 2022-05-31 DIAGNOSIS — F43.10: ICD-10-CM

## 2022-05-31 DIAGNOSIS — Z87.442: ICD-10-CM

## 2022-05-31 DIAGNOSIS — Z88.5: ICD-10-CM

## 2022-05-31 DIAGNOSIS — K58.9: ICD-10-CM

## 2022-05-31 DIAGNOSIS — Z88.1: ICD-10-CM

## 2022-05-31 LAB
ALBUMIN SERPL BCG-MCNC: 3.3 GM/DL (ref 3.2–5.2)
ALT SERPL W P-5'-P-CCNC: 19 U/L (ref 12–78)
BASOPHILS # BLD AUTO: 0 10^3/UL (ref 0–0.2)
BASOPHILS NFR BLD AUTO: 0.4 % (ref 0–1)
BILIRUB CONJ SERPL-MCNC: < 0.1 MG/DL (ref 0–0.2)
BILIRUB SERPL-MCNC: 0.3 MG/DL (ref 0.2–1)
EOSINOPHIL # BLD AUTO: 0.1 10^3/UL (ref 0–0.5)
EOSINOPHIL NFR BLD AUTO: 0.9 % (ref 0–3)
HCT VFR BLD AUTO: 41.3 % (ref 36–47)
HGB BLD-MCNC: 13.7 G/DL (ref 12–15.5)
LIPASE SERPL-CCNC: 92 U/L (ref 73–393)
LYMPHOCYTES # BLD AUTO: 1.8 10^3/UL (ref 1.5–5)
LYMPHOCYTES NFR BLD AUTO: 25.6 % (ref 24–44)
MCH RBC QN AUTO: 29.5 PG (ref 27–33)
MCHC RBC AUTO-ENTMCNC: 33.2 G/DL (ref 32–36.5)
MCV RBC AUTO: 89 FL (ref 80–96)
MONOCYTES # BLD AUTO: 0.5 10^3/UL (ref 0–0.8)
MONOCYTES NFR BLD AUTO: 7.3 % (ref 2–8)
NEUTROPHILS # BLD AUTO: 4.6 10^3/UL (ref 1.5–8.5)
NEUTROPHILS NFR BLD AUTO: 65.7 % (ref 36–66)
PLATELET # BLD AUTO: 351 10^3/UL (ref 150–450)
PROT SERPL-MCNC: 7.3 GM/DL (ref 6.4–8.2)
RBC # BLD AUTO: 4.64 10^6/UL (ref 4–5.4)
WBC # BLD AUTO: 7 10^3/UL (ref 4–10)

## 2022-05-31 PROCEDURE — 99284 EMERGENCY DEPT VISIT MOD MDM: CPT

## 2022-05-31 PROCEDURE — 96361 HYDRATE IV INFUSION ADD-ON: CPT

## 2022-05-31 PROCEDURE — 83690 ASSAY OF LIPASE: CPT

## 2022-05-31 PROCEDURE — 80076 HEPATIC FUNCTION PANEL: CPT

## 2022-05-31 PROCEDURE — 84702 CHORIONIC GONADOTROPIN TEST: CPT

## 2022-05-31 PROCEDURE — 87086 URINE CULTURE/COLONY COUNT: CPT

## 2022-05-31 PROCEDURE — 83605 ASSAY OF LACTIC ACID: CPT

## 2022-05-31 PROCEDURE — 85025 COMPLETE CBC W/AUTO DIFF WBC: CPT

## 2022-05-31 PROCEDURE — 81001 URINALYSIS AUTO W/SCOPE: CPT

## 2022-05-31 PROCEDURE — 74177 CT ABD & PELVIS W/CONTRAST: CPT

## 2022-05-31 PROCEDURE — 96374 THER/PROPH/DIAG INJ IV PUSH: CPT

## 2022-05-31 PROCEDURE — 80047 BASIC METABLC PNL IONIZED CA: CPT

## 2022-11-09 ENCOUNTER — HOSPITAL ENCOUNTER (EMERGENCY)
Dept: HOSPITAL 53 - M ED | Age: 42
Discharge: HOME | End: 2022-11-09
Payer: COMMERCIAL

## 2022-11-09 VITALS — SYSTOLIC BLOOD PRESSURE: 109 MMHG | DIASTOLIC BLOOD PRESSURE: 59 MMHG

## 2022-11-09 VITALS — BODY MASS INDEX: 41.57 KG/M2 | HEIGHT: 64 IN | WEIGHT: 243.5 LBS

## 2022-11-09 DIAGNOSIS — F43.10: ICD-10-CM

## 2022-11-09 DIAGNOSIS — Z88.1: ICD-10-CM

## 2022-11-09 DIAGNOSIS — Z88.5: ICD-10-CM

## 2022-11-09 DIAGNOSIS — M06.9: ICD-10-CM

## 2022-11-09 DIAGNOSIS — J45.909: ICD-10-CM

## 2022-11-09 DIAGNOSIS — Z79.82: ICD-10-CM

## 2022-11-09 DIAGNOSIS — K21.9: ICD-10-CM

## 2022-11-09 DIAGNOSIS — J09.X2: Primary | ICD-10-CM

## 2022-11-09 DIAGNOSIS — G43.909: ICD-10-CM

## 2022-11-09 DIAGNOSIS — Z79.899: ICD-10-CM

## 2022-12-16 ENCOUNTER — HOSPITAL ENCOUNTER (OUTPATIENT)
Dept: HOSPITAL 53 - M SFHCRHEU | Age: 42
End: 2022-12-16
Attending: INTERNAL MEDICINE
Payer: COMMERCIAL

## 2022-12-16 DIAGNOSIS — Z53.20: Primary | ICD-10-CM

## 2023-04-11 ENCOUNTER — HOSPITAL ENCOUNTER (OUTPATIENT)
Dept: HOSPITAL 53 - M PLALAB | Age: 43
End: 2023-04-11
Attending: INTERNAL MEDICINE
Payer: COMMERCIAL

## 2023-04-11 DIAGNOSIS — Z79.899: Primary | ICD-10-CM

## 2023-04-11 DIAGNOSIS — M06.00: ICD-10-CM

## 2023-04-11 LAB
25(OH)D3 SERPL-MCNC: 30.1 NG/ML (ref 20–100)
ALBUMIN SERPL BCG-MCNC: 3.1 G/DL (ref 3.2–5.2)
ALP SERPL-CCNC: 132 U/L (ref 46–116)
ALT SERPL W P-5'-P-CCNC: 14 U/L (ref 7–40)
AST SERPL-CCNC: 12 U/L (ref ?–34)
BASOPHILS # BLD AUTO: 0.1 10^3/UL (ref 0–0.2)
BASOPHILS NFR BLD AUTO: 0.7 % (ref 0–1)
BILIRUB CONJ SERPL-MCNC: < 0.1 MG/DL (ref ?–0.4)
BILIRUB SERPL-MCNC: 0.2 MG/DL (ref 0.3–1.2)
BUN SERPL-MCNC: 5 MG/DL (ref 9–23)
CALCIUM SERPL-MCNC: 8.4 MG/DL (ref 8.5–10.1)
CHLORIDE SERPL-SCNC: 103 MMOL/L (ref 98–107)
CO2 SERPL-SCNC: 29 MMOL/L (ref 20–31)
CREAT SERPL-MCNC: 0.75 MG/DL (ref 0.55–1.3)
CRP SERPL-MCNC: 1.3 MG/DL (ref ?–1)
EOSINOPHIL # BLD AUTO: 0.1 10^3/UL (ref 0–0.5)
EOSINOPHIL NFR BLD AUTO: 1.5 % (ref 0–3)
ERYTHROCYTE [SEDIMENTATION RATE] IN BLOOD BY WESTERGREN METHOD: 58 MM/HR (ref 0–20)
GFR SERPL CREATININE-BSD FRML MDRD: > 60 ML/MIN/{1.73_M2} (ref 58–?)
GLUCOSE SERPL-MCNC: 86 MG/DL (ref 60–100)
HCT VFR BLD AUTO: 40.9 % (ref 36–47)
HGB BLD-MCNC: 12.9 G/DL (ref 12–15.5)
LYMPHOCYTES # BLD AUTO: 2.4 10^3/UL (ref 1.5–5)
LYMPHOCYTES NFR BLD AUTO: 36.3 % (ref 24–44)
MCH RBC QN AUTO: 29.9 PG (ref 27–33)
MCHC RBC AUTO-ENTMCNC: 31.5 G/DL (ref 32–36.5)
MCV RBC AUTO: 94.9 FL (ref 80–96)
MONOCYTES # BLD AUTO: 0.5 10^3/UL (ref 0–0.8)
MONOCYTES NFR BLD AUTO: 6.7 % (ref 2–8)
NEUTROPHILS # BLD AUTO: 3.7 10^3/UL (ref 1.5–8.5)
NEUTROPHILS NFR BLD AUTO: 54.5 % (ref 36–66)
PLATELET # BLD AUTO: 386 10^3/UL (ref 150–450)
POTASSIUM SERPL-SCNC: 3.8 MMOL/L (ref 3.5–5.1)
PROT SERPL-MCNC: 6.5 G/DL (ref 5.7–8.2)
RBC # BLD AUTO: 4.31 10^6/UL (ref 4–5.4)
SODIUM SERPL-SCNC: 138 MMOL/L (ref 136–145)
WBC # BLD AUTO: 6.7 10^3/UL (ref 4–10)

## 2023-04-20 ENCOUNTER — HOSPITAL ENCOUNTER (OUTPATIENT)
Dept: HOSPITAL 53 - M SFHCWAGY | Age: 43
End: 2023-04-20
Payer: COMMERCIAL

## 2023-04-20 ENCOUNTER — HOSPITAL ENCOUNTER (OUTPATIENT)
Dept: HOSPITAL 53 - M WHC | Age: 43
End: 2023-04-20
Payer: COMMERCIAL

## 2023-04-20 DIAGNOSIS — Z12.4: Primary | ICD-10-CM

## 2023-04-20 DIAGNOSIS — Z12.31: Primary | ICD-10-CM

## 2023-04-20 PROCEDURE — 87624 HPV HI-RISK TYP POOLED RSLT: CPT

## 2023-04-24 ENCOUNTER — HOSPITAL ENCOUNTER (INPATIENT)
Dept: HOSPITAL 53 - M ED | Age: 43
LOS: 4 days | Discharge: HOME HEALTH | DRG: 313 | End: 2023-04-28
Attending: FAMILY MEDICINE | Admitting: FAMILY MEDICINE
Payer: COMMERCIAL

## 2023-04-24 VITALS — SYSTOLIC BLOOD PRESSURE: 124 MMHG | DIASTOLIC BLOOD PRESSURE: 84 MMHG

## 2023-04-24 VITALS — BODY MASS INDEX: 42.49 KG/M2 | WEIGHT: 248.9 LBS | HEIGHT: 64 IN

## 2023-04-24 VITALS — SYSTOLIC BLOOD PRESSURE: 125 MMHG | DIASTOLIC BLOOD PRESSURE: 76 MMHG

## 2023-04-24 DIAGNOSIS — Z88.5: ICD-10-CM

## 2023-04-24 DIAGNOSIS — G47.33: ICD-10-CM

## 2023-04-24 DIAGNOSIS — J45.909: ICD-10-CM

## 2023-04-24 DIAGNOSIS — S82.841A: Primary | ICD-10-CM
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
## 2023-04-24 DIAGNOSIS — F90.9: ICD-10-CM

## 2023-04-24 DIAGNOSIS — E66.01: ICD-10-CM

## 2023-04-24 DIAGNOSIS — Z79.01: ICD-10-CM

## 2023-04-24 DIAGNOSIS — Z79.899: ICD-10-CM

## 2023-04-24 DIAGNOSIS — M06.00: ICD-10-CM

## 2023-04-24 DIAGNOSIS — G43.909: ICD-10-CM

## 2023-04-24 DIAGNOSIS — Z88.0: ICD-10-CM

## 2023-04-24 DIAGNOSIS — E03.9: ICD-10-CM

## 2023-04-24 DIAGNOSIS — Z86.711: ICD-10-CM
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
## 2023-04-24 DIAGNOSIS — Z88.8: ICD-10-CM

## 2023-04-24 LAB
APTT BLD: 28.5 SECONDS (ref 24.8–34.2)
BUN SERPL-MCNC: 6 MG/DL (ref 9–23)
CALCIUM SERPL-MCNC: 8.5 MG/DL (ref 8.5–10.1)
CHLORIDE SERPL-SCNC: 108 MMOL/L (ref 98–107)
CO2 SERPL-SCNC: 26 MMOL/L (ref 20–31)
CREAT SERPL-MCNC: 0.77 MG/DL (ref 0.55–1.3)
GFR SERPL CREATININE-BSD FRML MDRD: > 60 ML/MIN/{1.73_M2} (ref 58–?)
GLUCOSE SERPL-MCNC: 95 MG/DL (ref 60–100)
HCT VFR BLD AUTO: 39.4 % (ref 36–47)
HGB BLD-MCNC: 12.8 G/DL (ref 12–15.5)
INR PPP: 0.98
MCH RBC QN AUTO: 30.1 PG (ref 27–33)
MCHC RBC AUTO-ENTMCNC: 32.5 G/DL (ref 32–36.5)
MCV RBC AUTO: 92.7 FL (ref 80–96)
PLATELET # BLD AUTO: 399 10^3/UL (ref 150–450)
POTASSIUM SERPL-SCNC: 4.6 MMOL/L (ref 3.5–5.1)
PROTHROMBIN TIME: 13.2 SECONDS (ref 12.5–14.5)
RBC # BLD AUTO: 4.25 10^6/UL (ref 4–5.4)
SODIUM SERPL-SCNC: 138 MMOL/L (ref 136–145)
WBC # BLD AUTO: 11.6 10^3/UL (ref 4–10)

## 2023-04-24 RX ADMIN — SODIUM CHLORIDE, POTASSIUM CHLORIDE, SODIUM LACTATE AND CALCIUM CHLORIDE SCH MLS/HR: 600; 310; 30; 20 INJECTION, SOLUTION INTRAVENOUS at 17:50

## 2023-04-24 RX ADMIN — SODIUM CHLORIDE, POTASSIUM CHLORIDE, SODIUM LACTATE AND CALCIUM CHLORIDE SCH MLS/HR: 600; 310; 30; 20 INJECTION, SOLUTION INTRAVENOUS at 20:30

## 2023-04-24 RX ADMIN — MORPHINE SULFATE PRN MG: 2 INJECTION, SOLUTION INTRAMUSCULAR; INTRAVENOUS at 22:23

## 2023-04-25 VITALS — DIASTOLIC BLOOD PRESSURE: 77 MMHG | SYSTOLIC BLOOD PRESSURE: 106 MMHG

## 2023-04-25 VITALS — DIASTOLIC BLOOD PRESSURE: 67 MMHG | SYSTOLIC BLOOD PRESSURE: 107 MMHG

## 2023-04-25 VITALS — DIASTOLIC BLOOD PRESSURE: 66 MMHG | SYSTOLIC BLOOD PRESSURE: 111 MMHG

## 2023-04-25 VITALS — SYSTOLIC BLOOD PRESSURE: 113 MMHG | DIASTOLIC BLOOD PRESSURE: 64 MMHG

## 2023-04-25 VITALS — SYSTOLIC BLOOD PRESSURE: 107 MMHG | DIASTOLIC BLOOD PRESSURE: 69 MMHG

## 2023-04-25 LAB
BUN SERPL-MCNC: < 5 MG/DL (ref 9–23)
CALCIUM SERPL-MCNC: 7.9 MG/DL (ref 8.5–10.1)
CHLORIDE SERPL-SCNC: 107 MMOL/L (ref 98–107)
CO2 SERPL-SCNC: 25 MMOL/L (ref 20–31)
CREAT SERPL-MCNC: 0.67 MG/DL (ref 0.55–1.3)
GFR SERPL CREATININE-BSD FRML MDRD: > 60 ML/MIN/{1.73_M2} (ref 58–?)
GLUCOSE SERPL-MCNC: 99 MG/DL (ref 60–100)
HCT VFR BLD AUTO: 35.6 % (ref 36–47)
HGB BLD-MCNC: 11.6 G/DL (ref 12–15.5)
MCH RBC QN AUTO: 30.2 PG (ref 27–33)
MCHC RBC AUTO-ENTMCNC: 32.6 G/DL (ref 32–36.5)
MCV RBC AUTO: 92.7 FL (ref 80–96)
PLATELET # BLD AUTO: 341 10^3/UL (ref 150–450)
POTASSIUM SERPL-SCNC: 4.6 MMOL/L (ref 3.5–5.1)
RBC # BLD AUTO: 3.84 10^6/UL (ref 4–5.4)
SODIUM SERPL-SCNC: 138 MMOL/L (ref 136–145)
WBC # BLD AUTO: 8.2 10^3/UL (ref 4–10)

## 2023-04-25 PROCEDURE — 0QSJ04Z REPOSITION RIGHT FIBULA WITH INTERNAL FIXATION DEVICE, OPEN APPROACH: ICD-10-PCS | Performed by: ORTHOPAEDIC SURGERY

## 2023-04-25 RX ADMIN — BUPROPION HYDROCHLORIDE SCH MG: 150 TABLET, FILM COATED, EXTENDED RELEASE ORAL at 21:08

## 2023-04-25 RX ADMIN — SODIUM CHLORIDE, POTASSIUM CHLORIDE, SODIUM LACTATE AND CALCIUM CHLORIDE SCH MLS/HR: 600; 310; 30; 20 INJECTION, SOLUTION INTRAVENOUS at 21:07

## 2023-04-25 RX ADMIN — HYDROXYCHLOROQUINE SULFATE SCH MG: 200 TABLET, FILM COATED ENTERAL at 08:46

## 2023-04-25 RX ADMIN — FAMOTIDINE SCH MG: 20 TABLET, FILM COATED ORAL at 00:43

## 2023-04-25 RX ADMIN — RISPERIDONE SCH MG: 0.5 TABLET ORAL at 21:11

## 2023-04-25 RX ADMIN — MORPHINE SULFATE PRN MG: 2 INJECTION, SOLUTION INTRAMUSCULAR; INTRAVENOUS at 02:39

## 2023-04-25 RX ADMIN — MORPHINE SULFATE PRN MG: 2 INJECTION, SOLUTION INTRAMUSCULAR; INTRAVENOUS at 08:46

## 2023-04-25 RX ADMIN — MULTIPLE VITAMINS W/ MINERALS TAB SCH TAB: TAB at 15:13

## 2023-04-25 RX ADMIN — RISPERIDONE SCH MG: 0.5 TABLET ORAL at 21:10

## 2023-04-25 RX ADMIN — NYSTATIN SCH DOSE: 100000 POWDER TOPICAL at 21:23

## 2023-04-25 RX ADMIN — CETIRIZINE HYDROCHLORIDE SCH MG: 10 TABLET, FILM COATED ORAL at 21:08

## 2023-04-25 RX ADMIN — RISPERIDONE SCH MG: 0.5 TABLET ORAL at 00:44

## 2023-04-25 RX ADMIN — BUPROPION HYDROCHLORIDE SCH MG: 150 TABLET, FILM COATED, EXTENDED RELEASE ORAL at 08:46

## 2023-04-25 RX ADMIN — ALUMINUM HYDROXIDE, MAGNESIUM HYDROXIDE, AND SIMETHICONE PRN ML: 200; 200; 20 SUSPENSION ORAL at 22:06

## 2023-04-25 RX ADMIN — SODIUM CHLORIDE, POTASSIUM CHLORIDE, SODIUM LACTATE AND CALCIUM CHLORIDE SCH MLS/HR: 600; 310; 30; 20 INJECTION, SOLUTION INTRAVENOUS at 05:54

## 2023-04-25 RX ADMIN — ONDANSETRON PRN MG: 2 INJECTION INTRAMUSCULAR; INTRAVENOUS at 19:05

## 2023-04-25 RX ADMIN — RISPERIDONE SCH MG: 0.5 TABLET ORAL at 09:16

## 2023-04-25 RX ADMIN — BUPROPION HYDROCHLORIDE SCH MG: 150 TABLET, FILM COATED, EXTENDED RELEASE ORAL at 00:42

## 2023-04-25 RX ADMIN — FAMOTIDINE SCH MG: 20 TABLET, FILM COATED ORAL at 21:08

## 2023-04-25 RX ADMIN — DOCUSATE SODIUM SCH MG: 100 CAPSULE, LIQUID FILLED ORAL at 21:10

## 2023-04-25 RX ADMIN — RIVAROXABAN SCH MG: 20 TABLET, FILM COATED ORAL at 21:10

## 2023-04-26 VITALS — DIASTOLIC BLOOD PRESSURE: 68 MMHG | SYSTOLIC BLOOD PRESSURE: 96 MMHG

## 2023-04-26 VITALS — DIASTOLIC BLOOD PRESSURE: 77 MMHG | SYSTOLIC BLOOD PRESSURE: 132 MMHG

## 2023-04-26 VITALS — SYSTOLIC BLOOD PRESSURE: 99 MMHG | DIASTOLIC BLOOD PRESSURE: 67 MMHG

## 2023-04-26 VITALS — DIASTOLIC BLOOD PRESSURE: 70 MMHG | SYSTOLIC BLOOD PRESSURE: 100 MMHG

## 2023-04-26 LAB
BUN SERPL-MCNC: 6 MG/DL (ref 9–23)
CALCIUM SERPL-MCNC: 8.3 MG/DL (ref 8.5–10.1)
CHLORIDE SERPL-SCNC: 106 MMOL/L (ref 98–107)
CO2 SERPL-SCNC: 26 MMOL/L (ref 20–31)
CREAT SERPL-MCNC: 0.58 MG/DL (ref 0.55–1.3)
GFR SERPL CREATININE-BSD FRML MDRD: > 60 ML/MIN/{1.73_M2} (ref 58–?)
GLUCOSE SERPL-MCNC: 114 MG/DL (ref 60–100)
HCT VFR BLD AUTO: 34.9 % (ref 36–47)
HGB BLD-MCNC: 11.3 G/DL (ref 12–15.5)
MCH RBC QN AUTO: 30.3 PG (ref 27–33)
MCHC RBC AUTO-ENTMCNC: 32.4 G/DL (ref 32–36.5)
MCV RBC AUTO: 93.6 FL (ref 80–96)
PLATELET # BLD AUTO: 364 10^3/UL (ref 150–450)
POTASSIUM SERPL-SCNC: 4.4 MMOL/L (ref 3.5–5.1)
RBC # BLD AUTO: 3.73 10^6/UL (ref 4–5.4)
SODIUM SERPL-SCNC: 139 MMOL/L (ref 136–145)
WBC # BLD AUTO: 9.5 10^3/UL (ref 4–10)

## 2023-04-26 RX ADMIN — ACETAMINOPHEN PRN MG: 325 TABLET ORAL at 14:11

## 2023-04-26 RX ADMIN — RIVAROXABAN SCH MG: 20 TABLET, FILM COATED ORAL at 17:19

## 2023-04-26 RX ADMIN — RISPERIDONE SCH MG: 0.5 TABLET ORAL at 17:20

## 2023-04-26 RX ADMIN — CEFAZOLIN SODIUM SCH MLS/HR: 2 SOLUTION INTRAVENOUS at 01:27

## 2023-04-26 RX ADMIN — OMEPRAZOLE SCH MG: 20 CAPSULE, DELAYED RELEASE ORAL at 11:17

## 2023-04-26 RX ADMIN — OMEPRAZOLE SCH MG: 20 CAPSULE, DELAYED RELEASE ORAL at 21:18

## 2023-04-26 RX ADMIN — ONDANSETRON PRN MG: 2 INJECTION INTRAMUSCULAR; INTRAVENOUS at 01:46

## 2023-04-26 RX ADMIN — RISPERIDONE SCH MG: 0.5 TABLET ORAL at 09:56

## 2023-04-26 RX ADMIN — SODIUM CHLORIDE, POTASSIUM CHLORIDE, SODIUM LACTATE AND CALCIUM CHLORIDE SCH MLS/HR: 600; 310; 30; 20 INJECTION, SOLUTION INTRAVENOUS at 21:20

## 2023-04-26 RX ADMIN — ACETAMINOPHEN PRN MG: 325 TABLET ORAL at 01:51

## 2023-04-26 RX ADMIN — ALUMINUM HYDROXIDE, MAGNESIUM HYDROXIDE, AND SIMETHICONE PRN ML: 200; 200; 20 SUSPENSION ORAL at 22:32

## 2023-04-26 RX ADMIN — CEFAZOLIN SODIUM SCH MLS/HR: 2 SOLUTION INTRAVENOUS at 17:19

## 2023-04-26 RX ADMIN — SODIUM CHLORIDE, POTASSIUM CHLORIDE, SODIUM LACTATE AND CALCIUM CHLORIDE SCH MLS/HR: 600; 310; 30; 20 INJECTION, SOLUTION INTRAVENOUS at 01:25

## 2023-04-26 RX ADMIN — SODIUM CHLORIDE, POTASSIUM CHLORIDE, SODIUM LACTATE AND CALCIUM CHLORIDE SCH MLS/HR: 600; 310; 30; 20 INJECTION, SOLUTION INTRAVENOUS at 11:17

## 2023-04-26 RX ADMIN — BUPROPION HYDROCHLORIDE SCH MG: 150 TABLET, FILM COATED, EXTENDED RELEASE ORAL at 17:19

## 2023-04-26 RX ADMIN — BUPROPION HYDROCHLORIDE SCH MG: 150 TABLET, FILM COATED, EXTENDED RELEASE ORAL at 11:17

## 2023-04-26 RX ADMIN — ONDANSETRON PRN MG: 2 INJECTION INTRAMUSCULAR; INTRAVENOUS at 10:09

## 2023-04-26 RX ADMIN — HYDROXYCHLOROQUINE SULFATE SCH MG: 200 TABLET, FILM COATED ENTERAL at 09:56

## 2023-04-26 RX ADMIN — MULTIPLE VITAMINS W/ MINERALS TAB SCH TAB: TAB at 09:55

## 2023-04-26 RX ADMIN — ONDANSETRON PRN MG: 2 INJECTION INTRAMUSCULAR; INTRAVENOUS at 22:36

## 2023-04-26 RX ADMIN — DOCUSATE SODIUM SCH MG: 100 CAPSULE, LIQUID FILLED ORAL at 09:56

## 2023-04-26 RX ADMIN — NYSTATIN SCH DOSE: 100000 POWDER TOPICAL at 21:19

## 2023-04-26 RX ADMIN — CEFAZOLIN SODIUM SCH MLS/HR: 2 SOLUTION INTRAVENOUS at 09:58

## 2023-04-26 RX ADMIN — DOCUSATE SODIUM SCH MG: 100 CAPSULE, LIQUID FILLED ORAL at 21:19

## 2023-04-26 RX ADMIN — RISPERIDONE SCH MG: 0.5 TABLET ORAL at 21:19

## 2023-04-26 RX ADMIN — CETIRIZINE HYDROCHLORIDE SCH MG: 10 TABLET, FILM COATED ORAL at 21:19

## 2023-04-26 RX ADMIN — NYSTATIN SCH DOSE: 100000 POWDER TOPICAL at 09:58

## 2023-04-27 VITALS — SYSTOLIC BLOOD PRESSURE: 101 MMHG | DIASTOLIC BLOOD PRESSURE: 66 MMHG

## 2023-04-27 RX ADMIN — RISPERIDONE SCH MG: 0.5 TABLET ORAL at 21:08

## 2023-04-27 RX ADMIN — HYDROXYCHLOROQUINE SULFATE SCH MG: 200 TABLET, FILM COATED ENTERAL at 08:21

## 2023-04-27 RX ADMIN — RISPERIDONE SCH MG: 0.5 TABLET ORAL at 16:57

## 2023-04-27 RX ADMIN — NYSTATIN SCH DOSE: 100000 POWDER TOPICAL at 08:50

## 2023-04-27 RX ADMIN — MULTIPLE VITAMINS W/ MINERALS TAB SCH TAB: TAB at 08:20

## 2023-04-27 RX ADMIN — DOCUSATE SODIUM SCH MG: 100 CAPSULE, LIQUID FILLED ORAL at 20:48

## 2023-04-27 RX ADMIN — OMEPRAZOLE SCH MG: 20 CAPSULE, DELAYED RELEASE ORAL at 21:08

## 2023-04-27 RX ADMIN — ONDANSETRON PRN MG: 2 INJECTION INTRAMUSCULAR; INTRAVENOUS at 18:11

## 2023-04-27 RX ADMIN — BUPROPION HYDROCHLORIDE SCH MG: 150 TABLET, FILM COATED, EXTENDED RELEASE ORAL at 16:57

## 2023-04-27 RX ADMIN — HYDROCODONE BITARTRATE AND ACETAMINOPHEN PRN TAB: 5; 325 TABLET ORAL at 17:42

## 2023-04-27 RX ADMIN — DOCUSATE SODIUM SCH MG: 100 CAPSULE, LIQUID FILLED ORAL at 08:21

## 2023-04-27 RX ADMIN — CETIRIZINE HYDROCHLORIDE SCH MG: 10 TABLET, FILM COATED ORAL at 21:08

## 2023-04-27 RX ADMIN — RIVAROXABAN SCH MG: 20 TABLET, FILM COATED ORAL at 16:58

## 2023-04-27 RX ADMIN — ACETAMINOPHEN PRN MG: 325 TABLET ORAL at 19:30

## 2023-04-27 RX ADMIN — ACETAMINOPHEN PRN MG: 325 TABLET ORAL at 08:50

## 2023-04-27 RX ADMIN — NYSTATIN SCH DOSE: 100000 POWDER TOPICAL at 21:08

## 2023-04-27 RX ADMIN — OMEPRAZOLE SCH MG: 20 CAPSULE, DELAYED RELEASE ORAL at 08:20

## 2023-04-27 RX ADMIN — ONDANSETRON PRN MG: 2 INJECTION INTRAMUSCULAR; INTRAVENOUS at 08:22

## 2023-04-27 RX ADMIN — RISPERIDONE SCH MG: 0.5 TABLET ORAL at 09:40

## 2023-04-27 RX ADMIN — BUPROPION HYDROCHLORIDE SCH MG: 150 TABLET, FILM COATED, EXTENDED RELEASE ORAL at 08:21

## 2023-04-28 VITALS — SYSTOLIC BLOOD PRESSURE: 129 MMHG | DIASTOLIC BLOOD PRESSURE: 79 MMHG

## 2023-04-28 RX ADMIN — OMEPRAZOLE SCH MG: 20 CAPSULE, DELAYED RELEASE ORAL at 09:43

## 2023-04-28 RX ADMIN — BUPROPION HYDROCHLORIDE SCH MG: 150 TABLET, FILM COATED, EXTENDED RELEASE ORAL at 09:43

## 2023-04-28 RX ADMIN — MULTIPLE VITAMINS W/ MINERALS TAB SCH TAB: TAB at 09:43

## 2023-04-28 RX ADMIN — DOCUSATE SODIUM SCH MG: 100 CAPSULE, LIQUID FILLED ORAL at 09:00

## 2023-04-28 RX ADMIN — RISPERIDONE SCH MG: 0.5 TABLET ORAL at 09:43

## 2023-04-28 RX ADMIN — HYDROCODONE BITARTRATE AND ACETAMINOPHEN PRN TAB: 5; 325 TABLET ORAL at 02:00

## 2023-04-28 RX ADMIN — ACETAMINOPHEN PRN MG: 325 TABLET ORAL at 06:19

## 2023-04-28 RX ADMIN — DOCUSATE SODIUM SCH MG: 100 CAPSULE, LIQUID FILLED ORAL at 09:44

## 2023-04-28 RX ADMIN — ONDANSETRON PRN MG: 2 INJECTION INTRAMUSCULAR; INTRAVENOUS at 02:31

## 2023-04-28 RX ADMIN — HYDROXYCHLOROQUINE SULFATE SCH MG: 200 TABLET, FILM COATED ENTERAL at 09:44

## 2023-04-28 RX ADMIN — NYSTATIN SCH DOSE: 100000 POWDER TOPICAL at 09:44

## 2023-04-28 RX ADMIN — HYDROCODONE BITARTRATE AND ACETAMINOPHEN PRN TAB: 5; 325 TABLET ORAL at 10:40

## 2023-05-01 ENCOUNTER — HOSPITAL ENCOUNTER (OUTPATIENT)
Dept: HOSPITAL 53 - M SOG | Age: 43
End: 2023-05-01
Attending: ORTHOPAEDIC SURGERY
Payer: COMMERCIAL

## 2023-05-01 DIAGNOSIS — Z47.89: Primary | ICD-10-CM

## 2023-05-03 ENCOUNTER — HOSPITAL ENCOUNTER (EMERGENCY)
Dept: HOSPITAL 53 - M ED | Age: 43
LOS: 1 days | Discharge: HOME | End: 2023-05-04
Payer: COMMERCIAL

## 2023-05-03 VITALS — BODY MASS INDEX: 41.91 KG/M2 | WEIGHT: 245.51 LBS | HEIGHT: 64 IN

## 2023-05-03 DIAGNOSIS — Z79.899: ICD-10-CM

## 2023-05-03 DIAGNOSIS — R51.9: ICD-10-CM

## 2023-05-03 DIAGNOSIS — Z79.891: ICD-10-CM

## 2023-05-03 DIAGNOSIS — Z79.01: ICD-10-CM

## 2023-05-03 DIAGNOSIS — B02.9: ICD-10-CM

## 2023-05-03 DIAGNOSIS — Z88.5: ICD-10-CM

## 2023-05-03 DIAGNOSIS — K21.9: ICD-10-CM

## 2023-05-03 DIAGNOSIS — Z88.1: ICD-10-CM

## 2023-05-03 DIAGNOSIS — L03.312: Primary | ICD-10-CM

## 2023-05-03 DIAGNOSIS — Z96.661: ICD-10-CM

## 2023-05-03 DIAGNOSIS — Z87.442: ICD-10-CM

## 2023-05-03 DIAGNOSIS — Z79.83: ICD-10-CM

## 2023-05-03 DIAGNOSIS — Z88.8: ICD-10-CM

## 2023-05-03 LAB
BASOPHILS # BLD AUTO: 0 10^3/UL (ref 0–0.2)
BASOPHILS NFR BLD AUTO: 0.5 % (ref 0–1)
EOSINOPHIL # BLD AUTO: 0.1 10^3/UL (ref 0–0.5)
EOSINOPHIL NFR BLD AUTO: 0.8 % (ref 0–3)
HCT VFR BLD AUTO: 38.3 % (ref 36–47)
HGB BLD-MCNC: 12.7 G/DL (ref 12–15.5)
LYMPHOCYTES # BLD AUTO: 1.7 10^3/UL (ref 1.5–5)
LYMPHOCYTES NFR BLD AUTO: 22.6 % (ref 24–44)
MCH RBC QN AUTO: 30.4 PG (ref 27–33)
MCHC RBC AUTO-ENTMCNC: 33.2 G/DL (ref 32–36.5)
MCV RBC AUTO: 91.6 FL (ref 80–96)
MONOCYTES # BLD AUTO: 0.9 10^3/UL (ref 0–0.8)
MONOCYTES NFR BLD AUTO: 12.2 % (ref 2–8)
NEUTROPHILS # BLD AUTO: 4.7 10^3/UL (ref 1.5–8.5)
NEUTROPHILS NFR BLD AUTO: 63.5 % (ref 36–66)
PLATELET # BLD AUTO: 410 10^3/UL (ref 150–450)
RBC # BLD AUTO: 4.18 10^6/UL (ref 4–5.4)
WBC # BLD AUTO: 7.4 10^3/UL (ref 4–10)

## 2023-05-04 VITALS — DIASTOLIC BLOOD PRESSURE: 56 MMHG | SYSTOLIC BLOOD PRESSURE: 103 MMHG

## 2023-05-05 ENCOUNTER — HOSPITAL ENCOUNTER (OUTPATIENT)
Dept: HOSPITAL 53 - M SOG | Age: 43
End: 2023-05-05
Attending: ORTHOPAEDIC SURGERY
Payer: COMMERCIAL

## 2023-05-05 DIAGNOSIS — S82.841D: Primary | ICD-10-CM

## 2023-05-05 DIAGNOSIS — M79.89: ICD-10-CM

## 2023-05-11 ENCOUNTER — HOSPITAL ENCOUNTER (OUTPATIENT)
Dept: HOSPITAL 53 - M SOG | Age: 43
End: 2023-05-11
Attending: ORTHOPAEDIC SURGERY
Payer: COMMERCIAL

## 2023-05-11 DIAGNOSIS — S82.431D: Primary | ICD-10-CM

## 2023-07-07 ENCOUNTER — HOSPITAL ENCOUNTER (OUTPATIENT)
Dept: HOSPITAL 53 - M SOG | Age: 43
End: 2023-07-07
Attending: ORTHOPAEDIC SURGERY
Payer: COMMERCIAL
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
## 2023-07-07 DIAGNOSIS — S82.431D: Primary | ICD-10-CM

## 2023-08-08 ENCOUNTER — HOSPITAL ENCOUNTER (OUTPATIENT)
Dept: HOSPITAL 53 - M LAB REF | Age: 43
End: 2023-08-08
Attending: INTERNAL MEDICINE
Payer: COMMERCIAL

## 2023-08-08 DIAGNOSIS — M06.9: Primary | ICD-10-CM

## 2023-09-06 ENCOUNTER — HOSPITAL ENCOUNTER (OUTPATIENT)
Dept: HOSPITAL 53 - M SOG | Age: 43
End: 2023-09-06
Attending: ORTHOPAEDIC SURGERY
Payer: COMMERCIAL
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
## 2023-09-06 DIAGNOSIS — S82.391D: Primary | ICD-10-CM

## 2023-10-30 ENCOUNTER — HOSPITAL ENCOUNTER (OUTPATIENT)
Dept: HOSPITAL 53 - M PLAIMG | Age: 43
End: 2023-10-30
Payer: COMMERCIAL

## 2023-10-30 DIAGNOSIS — Z12.39: Primary | ICD-10-CM

## 2024-01-30 ENCOUNTER — HOSPITAL ENCOUNTER (OUTPATIENT)
Dept: HOSPITAL 53 - M PLALAB | Age: 44
End: 2024-01-30
Attending: INTERNAL MEDICINE
Payer: COMMERCIAL

## 2024-01-30 DIAGNOSIS — Z79.899: ICD-10-CM

## 2024-01-30 DIAGNOSIS — M06.00: Primary | ICD-10-CM

## 2024-01-31 ENCOUNTER — HOSPITAL ENCOUNTER (OUTPATIENT)
Dept: HOSPITAL 53 - M LABDRAWP | Age: 44
End: 2024-01-31
Attending: PSYCHIATRY & NEUROLOGY
Payer: COMMERCIAL

## 2024-01-31 DIAGNOSIS — E55.9: ICD-10-CM

## 2024-01-31 DIAGNOSIS — E51.9: ICD-10-CM

## 2024-01-31 DIAGNOSIS — E56.0: ICD-10-CM

## 2024-01-31 DIAGNOSIS — G60.9: Primary | ICD-10-CM

## 2024-01-31 DIAGNOSIS — E53.1: ICD-10-CM

## 2024-01-31 DIAGNOSIS — D51.9: ICD-10-CM

## 2024-01-31 LAB
ALBUMIN SERPL BCG-MCNC: 3.3 G/DL (ref 3.2–5.2)
ALP SERPL-CCNC: 144 U/L (ref 46–116)
ALT SERPL W P-5'-P-CCNC: 10 U/L (ref 7–40)
AST SERPL-CCNC: 9 U/L (ref ?–34)
BASOPHILS # BLD AUTO: 0 10^3/UL (ref 0–0.2)
BASOPHILS NFR BLD AUTO: 0.5 % (ref 0–1)
BILIRUB SERPL-MCNC: 0.2 MG/DL (ref 0.3–1.2)
BUN SERPL-MCNC: 7 MG/DL (ref 9–23)
CALCIUM SERPL-MCNC: 8.6 MG/DL (ref 8.5–10.1)
CHLORIDE SERPL-SCNC: 104 MMOL/L (ref 98–107)
CO2 SERPL-SCNC: 27 MMOL/L (ref 20–31)
CREAT SERPL-MCNC: 0.75 MG/DL (ref 0.55–1.3)
EOSINOPHIL # BLD AUTO: 0.1 10^3/UL (ref 0–0.5)
EOSINOPHIL NFR BLD AUTO: 1.2 % (ref 0–3)
FOLATE SERPL-MCNC: 6.2 NG/ML (ref 5.4–?)
GFR SERPL CREATININE-BSD FRML MDRD: > 60 ML/MIN/{1.73_M2} (ref 58–?)
GLUCOSE SERPL-MCNC: 80 MG/DL (ref 60–100)
HCT VFR BLD AUTO: 40.1 % (ref 36–47)
HGB BLD-MCNC: 13.1 G/DL (ref 12–15.5)
LYMPHOCYTES # BLD AUTO: 2.6 10^3/UL (ref 1.5–5)
LYMPHOCYTES NFR BLD AUTO: 29.6 % (ref 24–44)
MCH RBC QN AUTO: 30.3 PG (ref 27–33)
MCHC RBC AUTO-ENTMCNC: 32.7 G/DL (ref 32–36.5)
MCV RBC AUTO: 92.8 FL (ref 80–96)
MONOCYTES # BLD AUTO: 0.5 10^3/UL (ref 0–0.8)
MONOCYTES NFR BLD AUTO: 5.4 % (ref 2–8)
NEUTROPHILS # BLD AUTO: 5.5 10^3/UL (ref 1.5–8.5)
NEUTROPHILS NFR BLD AUTO: 63.1 % (ref 36–66)
PLATELET # BLD AUTO: 376 10^3/UL (ref 150–450)
POTASSIUM SERPL-SCNC: 3.9 MMOL/L (ref 3.5–5.1)
PROT SERPL-MCNC: 7 G/DL (ref 5.7–8.2)
RBC # BLD AUTO: 4.32 10^6/UL (ref 4–5.4)
SODIUM SERPL-SCNC: 137 MMOL/L (ref 136–145)
TSH SERPL DL<=0.005 MIU/L-ACNC: 1.91 UIU/ML (ref 0.55–4.78)
VIT B12 SERPL-MCNC: 290 PG/ML (ref 211–911)
WBC # BLD AUTO: 8.6 10^3/UL (ref 4–10)

## 2024-04-02 ENCOUNTER — HOSPITAL ENCOUNTER (EMERGENCY)
Dept: HOSPITAL 53 - M ED | Age: 44
LOS: 1 days | Discharge: HOME | End: 2024-04-03
Payer: COMMERCIAL

## 2024-04-02 DIAGNOSIS — Z79.899: ICD-10-CM

## 2024-04-02 DIAGNOSIS — G43.909: ICD-10-CM

## 2024-04-02 DIAGNOSIS — Z79.891: ICD-10-CM

## 2024-04-02 DIAGNOSIS — J45.909: ICD-10-CM

## 2024-04-02 DIAGNOSIS — Z88.1: ICD-10-CM

## 2024-04-02 DIAGNOSIS — Z88.8: ICD-10-CM

## 2024-04-02 DIAGNOSIS — Z79.83: ICD-10-CM

## 2024-04-02 DIAGNOSIS — K58.9: ICD-10-CM

## 2024-04-02 DIAGNOSIS — A08.4: Primary | ICD-10-CM

## 2024-04-02 DIAGNOSIS — Z79.1: ICD-10-CM

## 2024-04-02 DIAGNOSIS — Z88.5: ICD-10-CM

## 2024-04-02 LAB
ALBUMIN SERPL BCG-MCNC: 3.5 G/DL (ref 3.2–5.2)
ALP SERPL-CCNC: 199 U/L (ref 46–116)
ALT SERPL W P-5'-P-CCNC: 90 U/L (ref 7–40)
AST SERPL-CCNC: 169 U/L (ref ?–34)
BASOPHILS # BLD AUTO: 0 10^3/UL (ref 0–0.2)
BASOPHILS NFR BLD AUTO: 0.4 % (ref 0–1)
BILIRUB CONJ SERPL-MCNC: 0.2 MG/DL (ref ?–0.4)
BILIRUB SERPL-MCNC: 0.6 MG/DL (ref 0.3–1.2)
EOSINOPHIL # BLD AUTO: 0 10^3/UL (ref 0–0.5)
EOSINOPHIL NFR BLD AUTO: 0.1 % (ref 0–3)
HCT VFR BLD AUTO: 43 % (ref 36–47)
HGB BLD-MCNC: 14.4 G/DL (ref 12–15.5)
LIPASE SERPL-CCNC: 21 U/L (ref 12–53)
LYMPHOCYTES # BLD AUTO: 0.4 10^3/UL (ref 1.5–5)
LYMPHOCYTES NFR BLD AUTO: 4.9 % (ref 24–44)
MCH RBC QN AUTO: 30.4 PG (ref 27–33)
MCHC RBC AUTO-ENTMCNC: 33.5 G/DL (ref 32–36.5)
MCV RBC AUTO: 90.9 FL (ref 80–96)
MONOCYTES # BLD AUTO: 0.6 10^3/UL (ref 0–0.8)
MONOCYTES NFR BLD AUTO: 7.2 % (ref 2–8)
NEUTROPHILS # BLD AUTO: 7.3 10^3/UL (ref 1.5–8.5)
NEUTROPHILS NFR BLD AUTO: 87 % (ref 36–66)
PLATELET # BLD AUTO: 388 10^3/UL (ref 150–450)
PROT SERPL-MCNC: 7.1 G/DL (ref 5.7–8.2)
RBC # BLD AUTO: 4.73 10^6/UL (ref 4–5.4)
WBC # BLD AUTO: 8.4 10^3/UL (ref 4–10)

## 2024-04-02 PROCEDURE — 80047 BASIC METABLC PNL IONIZED CA: CPT

## 2024-04-02 PROCEDURE — 96361 HYDRATE IV INFUSION ADD-ON: CPT

## 2024-04-02 PROCEDURE — 85025 COMPLETE CBC W/AUTO DIFF WBC: CPT

## 2024-04-02 PROCEDURE — 84702 CHORIONIC GONADOTROPIN TEST: CPT

## 2024-04-02 PROCEDURE — 80076 HEPATIC FUNCTION PANEL: CPT

## 2024-04-02 PROCEDURE — 99284 EMERGENCY DEPT VISIT MOD MDM: CPT

## 2024-04-02 PROCEDURE — 96374 THER/PROPH/DIAG INJ IV PUSH: CPT

## 2024-04-02 PROCEDURE — 83605 ASSAY OF LACTIC ACID: CPT

## 2024-04-02 PROCEDURE — 83690 ASSAY OF LIPASE: CPT

## 2024-04-02 PROCEDURE — 74177 CT ABD & PELVIS W/CONTRAST: CPT

## 2024-04-02 RX ADMIN — FAMOTIDINE ONE MG: 10 INJECTION INTRAVENOUS at 21:10

## 2024-04-02 RX ADMIN — SODIUM CHLORIDE ONE MLS/HR: 9 INJECTION, SOLUTION INTRAVENOUS at 20:53

## 2024-04-03 VITALS — TEMPERATURE: 98.9 F | SYSTOLIC BLOOD PRESSURE: 132 MMHG | OXYGEN SATURATION: 99 % | DIASTOLIC BLOOD PRESSURE: 72 MMHG

## 2024-06-14 ENCOUNTER — HOSPITAL ENCOUNTER (OUTPATIENT)
Dept: HOSPITAL 53 - M PLALAB | Age: 44
End: 2024-06-14
Attending: INTERNAL MEDICINE
Payer: COMMERCIAL

## 2024-06-14 DIAGNOSIS — M06.00: Primary | ICD-10-CM

## 2024-06-14 DIAGNOSIS — Z79.899: ICD-10-CM

## 2024-06-14 DIAGNOSIS — Z11.59: ICD-10-CM

## 2024-06-14 LAB
ALBUMIN SERPL BCG-MCNC: 3.4 G/DL (ref 3.2–5.2)
ALP SERPL-CCNC: 154 U/L (ref 46–116)
ALT SERPL W P-5'-P-CCNC: 14 U/L (ref 7–40)
AST SERPL-CCNC: < 8 U/L (ref ?–34)
BASOPHILS # BLD AUTO: 0 10^3/UL (ref 0–0.2)
BASOPHILS NFR BLD AUTO: 0.6 % (ref 0–1)
BILIRUB CONJ SERPL-MCNC: < 0.1 MG/DL (ref ?–0.4)
BILIRUB SERPL-MCNC: 0.3 MG/DL (ref 0.3–1.2)
BUN SERPL-MCNC: 7 MG/DL (ref 9–23)
CALCIUM SERPL-MCNC: 8.2 MG/DL (ref 8.5–10.1)
CHLORIDE SERPL-SCNC: 105 MMOL/L (ref 98–107)
CO2 SERPL-SCNC: 26 MMOL/L (ref 20–31)
CREAT SERPL-MCNC: 0.72 MG/DL (ref 0.55–1.3)
CRP SERPL-MCNC: 1.2 MG/DL (ref ?–1)
EOSINOPHIL # BLD AUTO: 0.1 10^3/UL (ref 0–0.5)
EOSINOPHIL NFR BLD AUTO: 0.7 % (ref 0–3)
ERYTHROCYTE [SEDIMENTATION RATE] IN BLOOD BY WESTERGREN METHOD: 54 MM/HR (ref 0–20)
GFR SERPL CREATININE-BSD FRML MDRD: > 60 ML/MIN/{1.73_M2} (ref 58–?)
GLUCOSE SERPL-MCNC: 90 MG/DL (ref 60–100)
HBV SURFACE AB SER QL: NEGATIVE
HBV SURFACE AB SER-ACNC: NEGATIVE M[IU]/ML
HCT VFR BLD AUTO: 39.1 % (ref 36–47)
HCV AB SER QL: < 0.02 INDEX (ref ?–0.8)
HGB BLD-MCNC: 12.8 G/DL (ref 12–15.5)
LYMPHOCYTES # BLD AUTO: 2.2 10^3/UL (ref 1.5–5)
LYMPHOCYTES NFR BLD AUTO: 31 % (ref 24–44)
MCH RBC QN AUTO: 29.4 PG (ref 27–33)
MCHC RBC AUTO-ENTMCNC: 32.7 G/DL (ref 32–36.5)
MCV RBC AUTO: 89.7 FL (ref 80–96)
MONOCYTES # BLD AUTO: 0.5 10^3/UL (ref 0–0.8)
MONOCYTES NFR BLD AUTO: 6.5 % (ref 2–8)
NEUTROPHILS # BLD AUTO: 4.3 10^3/UL (ref 1.5–8.5)
NEUTROPHILS NFR BLD AUTO: 60.9 % (ref 36–66)
PLATELET # BLD AUTO: 441 10^3/UL (ref 150–450)
POTASSIUM SERPL-SCNC: 4 MMOL/L (ref 3.5–5.1)
PROT SERPL-MCNC: 6.7 G/DL (ref 5.7–8.2)
RBC # BLD AUTO: 4.36 10^6/UL (ref 4–5.4)
SODIUM SERPL-SCNC: 137 MMOL/L (ref 136–145)
WBC # BLD AUTO: 7 10^3/UL (ref 4–10)

## 2024-08-09 ENCOUNTER — HOSPITAL ENCOUNTER (EMERGENCY)
Dept: HOSPITAL 53 - M ED | Age: 44
Discharge: HOME | End: 2024-08-09
Payer: COMMERCIAL

## 2024-08-09 VITALS — HEIGHT: 64 IN | WEIGHT: 244.27 LBS | BODY MASS INDEX: 41.7 KG/M2

## 2024-08-09 VITALS — TEMPERATURE: 97.2 F | SYSTOLIC BLOOD PRESSURE: 122 MMHG | DIASTOLIC BLOOD PRESSURE: 84 MMHG | OXYGEN SATURATION: 100 %

## 2024-08-09 DIAGNOSIS — M51.36: ICD-10-CM

## 2024-08-09 DIAGNOSIS — Z79.83: ICD-10-CM

## 2024-08-09 DIAGNOSIS — Z88.5: ICD-10-CM

## 2024-08-09 DIAGNOSIS — Z79.1: ICD-10-CM

## 2024-08-09 DIAGNOSIS — Z88.1: ICD-10-CM

## 2024-08-09 DIAGNOSIS — F43.10: ICD-10-CM

## 2024-08-09 DIAGNOSIS — J01.90: Primary | ICD-10-CM

## 2024-08-09 DIAGNOSIS — Z88.8: ICD-10-CM

## 2024-08-09 DIAGNOSIS — Z86.711: ICD-10-CM

## 2024-08-09 DIAGNOSIS — G43.909: ICD-10-CM

## 2024-08-09 DIAGNOSIS — Z79.899: ICD-10-CM

## 2024-08-09 LAB
B-HCG SERPL QL: NEGATIVE
BASOPHILS # BLD AUTO: 0 10^3/UL (ref 0–0.2)
BASOPHILS NFR BLD AUTO: 0.4 % (ref 0–1)
EOSINOPHIL # BLD AUTO: 0 10^3/UL (ref 0–0.5)
EOSINOPHIL NFR BLD AUTO: 0.4 % (ref 0–3)
HCT VFR BLD AUTO: 38.1 % (ref 36–47)
HGB BLD-MCNC: 12.9 G/DL (ref 12–15.5)
LYMPHOCYTES # BLD AUTO: 2.1 10^3/UL (ref 1.5–5)
LYMPHOCYTES NFR BLD AUTO: 20.7 % (ref 24–44)
MCH RBC QN AUTO: 30.8 PG (ref 27–33)
MCHC RBC AUTO-ENTMCNC: 33.9 G/DL (ref 32–36.5)
MCV RBC AUTO: 90.9 FL (ref 80–96)
MONOCYTES # BLD AUTO: 0.7 10^3/UL (ref 0–0.8)
MONOCYTES NFR BLD AUTO: 6.4 % (ref 2–8)
NEUTROPHILS # BLD AUTO: 7.3 10^3/UL (ref 1.5–8.5)
NEUTROPHILS NFR BLD AUTO: 71.8 % (ref 36–66)
PLATELET # BLD AUTO: 309 10^3/UL (ref 150–450)
RBC # BLD AUTO: 4.19 10^6/UL (ref 4–5.4)
WBC # BLD AUTO: 10.2 10^3/UL (ref 4–10)

## 2024-08-09 PROCEDURE — 99283 EMERGENCY DEPT VISIT LOW MDM: CPT

## 2024-08-09 PROCEDURE — 70491 CT SOFT TISSUE NECK W/DYE: CPT

## 2024-08-09 PROCEDURE — 84703 CHORIONIC GONADOTROPIN ASSAY: CPT

## 2024-08-09 PROCEDURE — 80047 BASIC METABLC PNL IONIZED CA: CPT

## 2024-08-09 PROCEDURE — 36415 COLL VENOUS BLD VENIPUNCTURE: CPT

## 2024-08-09 PROCEDURE — 85025 COMPLETE CBC W/AUTO DIFF WBC: CPT

## 2024-08-09 RX ADMIN — ACETAMINOPHEN ONE MG: 500 TABLET ORAL at 16:12

## 2024-12-09 ENCOUNTER — HOSPITAL ENCOUNTER (OUTPATIENT)
Dept: HOSPITAL 53 - M RAD | Age: 44
End: 2024-12-09
Attending: PHYSICIAN ASSISTANT
Payer: COMMERCIAL

## 2024-12-09 DIAGNOSIS — J01.00: Primary | ICD-10-CM

## 2025-02-07 ENCOUNTER — HOSPITAL ENCOUNTER (OUTPATIENT)
Dept: HOSPITAL 53 - M PLALAB | Age: 45
End: 2025-02-07
Attending: INTERNAL MEDICINE
Payer: COMMERCIAL

## 2025-02-07 DIAGNOSIS — M06.00: Primary | ICD-10-CM

## 2025-02-07 LAB
ALBUMIN SERPL BCG-MCNC: 3.3 G/DL (ref 3.2–5.2)
ALP SERPL-CCNC: 142 U/L (ref 35–104)
ALT SERPL W P-5'-P-CCNC: 22 U/L (ref 7–40)
AST SERPL-CCNC: 12 U/L (ref ?–34)
BASOPHILS # BLD AUTO: 0 10^3/UL (ref 0–0.2)
BASOPHILS NFR BLD AUTO: 0.5 % (ref 0–1)
BILIRUB CONJ SERPL-MCNC: < 0.1 MG/DL (ref ?–0.4)
BILIRUB SERPL-MCNC: 0.2 MG/DL (ref 0.3–1.2)
BUN SERPL-MCNC: 6 MG/DL (ref 9–23)
CALCIUM SERPL-MCNC: 8.9 MG/DL (ref 8.5–10.1)
CHLORIDE SERPL-SCNC: 105 MMOL/L (ref 98–107)
CO2 SERPL-SCNC: 27 MMOL/L (ref 20–31)
CREAT SERPL-MCNC: 0.88 MG/DL (ref 0.55–1.3)
CRP SERPL-MCNC: 0.59 MG/DL (ref ?–1)
EOSINOPHIL # BLD AUTO: 0.1 10^3/UL (ref 0–0.5)
EOSINOPHIL NFR BLD AUTO: 1.4 % (ref 0–3)
ERYTHROCYTE [SEDIMENTATION RATE] IN BLOOD BY WESTERGREN METHOD: 32 MM/HR (ref 0–20)
GFR SERPL CREATININE-BSD FRML MDRD: > 60 ML/MIN/{1.73_M2} (ref 58–?)
GLUCOSE SERPL-MCNC: 82 MG/DL (ref 60–100)
HCT VFR BLD AUTO: 40.2 % (ref 36–47)
HGB BLD-MCNC: 12.8 G/DL (ref 12–15.5)
LYMPHOCYTES # BLD AUTO: 1.9 10^3/UL (ref 1.5–5)
LYMPHOCYTES NFR BLD AUTO: 31.9 % (ref 24–44)
MCH RBC QN AUTO: 29.5 PG (ref 27–33)
MCHC RBC AUTO-ENTMCNC: 31.8 G/DL (ref 32–36.5)
MCV RBC AUTO: 92.6 FL (ref 80–96)
MONOCYTES # BLD AUTO: 0.5 10^3/UL (ref 0–0.8)
MONOCYTES NFR BLD AUTO: 8.6 % (ref 2–8)
NEUTROPHILS # BLD AUTO: 3.4 10^3/UL (ref 1.5–8.5)
NEUTROPHILS NFR BLD AUTO: 57.4 % (ref 36–66)
PLATELET # BLD AUTO: 372 10^3/UL (ref 150–450)
POTASSIUM SERPL-SCNC: 4.8 MMOL/L (ref 3.5–5.1)
PROT SERPL-MCNC: 7.1 G/DL (ref 5.7–8.2)
RBC # BLD AUTO: 4.34 10^6/UL (ref 4–5.4)
SODIUM SERPL-SCNC: 139 MMOL/L (ref 136–145)
WBC # BLD AUTO: 5.9 10^3/UL (ref 4–10)

## 2025-02-28 ENCOUNTER — HOSPITAL ENCOUNTER (OUTPATIENT)
Dept: HOSPITAL 53 - M PLALAB | Age: 45
End: 2025-02-28
Attending: INTERNAL MEDICINE
Payer: COMMERCIAL

## 2025-02-28 DIAGNOSIS — B99.9: Primary | ICD-10-CM

## 2025-02-28 LAB
IGA SERPL-MCNC: 289.9 MG/DL (ref 40–350)
IGG SERPL-MCNC: 1148 MG/DL (ref 650–1600)
IGM SERPL-MCNC: 97.7 MG/DL (ref 50–300)

## 2025-03-02 LAB — PROT SERPL-MCNC: 7.1 G/DL (ref 6.1–8.1)

## 2025-03-04 LAB
ALBUMIN SERPL ELPH-MCNC: 4 G/DL (ref 3.8–4.8)
ALPHA1 GLOB SERPL ELPH-MCNC: 0.3 G/DL (ref 0.2–0.3)
ALPHA2 GLOB SERPL ELPH-MCNC: 0.8 G/DL (ref 0.5–0.9)
BETA1 GLOB SERPL ELPH-MCNC: 0.5 G/DL (ref 0.4–0.6)
BETA2 GLOB SERPL ELPH-MCNC: 0.5 G/DL (ref 0.2–0.5)
GAMMA GLOB SERPL ELPH-MCNC: 1.1 G/DL (ref 0.8–1.7)
PROT PATTERN SERPL ELPH-IMP: (no result)